# Patient Record
Sex: FEMALE | Race: BLACK OR AFRICAN AMERICAN
[De-identification: names, ages, dates, MRNs, and addresses within clinical notes are randomized per-mention and may not be internally consistent; named-entity substitution may affect disease eponyms.]

---

## 2017-06-22 ENCOUNTER — HOSPITAL ENCOUNTER (EMERGENCY)
Dept: HOSPITAL 17 - NEPK | Age: 22
Discharge: HOME | End: 2017-06-22
Payer: SELF-PAY

## 2017-06-22 VITALS
TEMPERATURE: 98.3 F | RESPIRATION RATE: 18 BRPM | DIASTOLIC BLOOD PRESSURE: 69 MMHG | OXYGEN SATURATION: 100 % | SYSTOLIC BLOOD PRESSURE: 120 MMHG | HEART RATE: 101 BPM

## 2017-06-22 VITALS — WEIGHT: 143.3 LBS | BODY MASS INDEX: 26.37 KG/M2 | HEIGHT: 62 IN
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
## 2017-06-22 DIAGNOSIS — Z72.0: ICD-10-CM

## 2017-06-22 DIAGNOSIS — S81.012A: Primary | ICD-10-CM
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
## 2017-06-22 PROCEDURE — 12001 RPR S/N/AX/GEN/TRNK 2.5CM/<: CPT

## 2017-06-22 PROCEDURE — 73564 X-RAY EXAM KNEE 4 OR MORE: CPT
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
## 2017-06-24 ENCOUNTER — HOSPITAL ENCOUNTER (EMERGENCY)
Dept: HOSPITAL 17 - NEPD | Age: 22
Discharge: HOME | End: 2017-06-24
Payer: SELF-PAY

## 2017-06-24 VITALS
OXYGEN SATURATION: 98 % | RESPIRATION RATE: 16 BRPM | HEART RATE: 103 BPM | SYSTOLIC BLOOD PRESSURE: 124 MMHG | TEMPERATURE: 98.5 F | DIASTOLIC BLOOD PRESSURE: 68 MMHG

## 2017-06-24 VITALS — WEIGHT: 158.73 LBS | HEIGHT: 62 IN | BODY MASS INDEX: 29.21 KG/M2

## 2017-06-24 DIAGNOSIS — Z79.899: ICD-10-CM

## 2017-06-24 DIAGNOSIS — Z48.02: Primary | ICD-10-CM

## 2017-06-24 PROCEDURE — 99281 EMR DPT VST MAYX REQ PHY/QHP: CPT
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
## 2017-07-01 ENCOUNTER — HOSPITAL ENCOUNTER (EMERGENCY)
Dept: HOSPITAL 17 - NEPK | Age: 22
Discharge: HOME | End: 2017-07-01
Payer: SELF-PAY

## 2017-07-01 VITALS
HEART RATE: 95 BPM | TEMPERATURE: 98.8 F | RESPIRATION RATE: 16 BRPM | SYSTOLIC BLOOD PRESSURE: 124 MMHG | DIASTOLIC BLOOD PRESSURE: 69 MMHG | OXYGEN SATURATION: 98 %

## 2017-07-01 VITALS — WEIGHT: 154.32 LBS | BODY MASS INDEX: 28.4 KG/M2 | HEIGHT: 62 IN

## 2017-07-01 DIAGNOSIS — S71.112D: Primary | ICD-10-CM

## 2017-07-01 DIAGNOSIS — Z48.02: ICD-10-CM
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
## 2017-07-01 PROCEDURE — 99281 EMR DPT VST MAYX REQ PHY/QHP: CPT

## 2017-07-01 NOTE — PD
HPI


Chief Complaint:  Wound/Suture/Staple Re-Check


Time Seen by Provider:  16:29


Travel History


International Travel<30 days:  No


Contact w/Intl Traveler<30days:  No


Traveled to known affect area:  No





History of Present Illness


HPI


21-year-old female presents to emergency department requesting suture removal 

to laceration just above her left knee that has been there since .  She 

denies fever, vomiting.  Denies drainage, redness, swelling to the wound site.  

Has no other medical complaints.  No known allergies.  No modifying factors or 

associated signs and symptoms.





PFSH


Past Medical History


Autoimmune Disease:  No


Cardiovascular Problems:  No


Diminished Hearing:  No


Genitourinary:  Yes (STD)


Musculoskeletal:  No


Neurologic:  No


Psychiatric:  No


Respiratory:  No


Immunizations Current:  Yes


Sickle Cell Disease:  No


:  0





Past Surgical History


Oral Surgery:  Yes





Social History


Alcohol Use:  No


Tobacco Use:  Yes


Substance Use:  Yes





Allergies-Medications


(Allergen,Severity, Reaction):  


Coded Allergies:  


     No Known Allergies (Verified , 17)


Reported Meds & Prescriptions





Reported Meds & Active Scripts


Active


Tramadol (Tramadol HCl) 50 Mg Tab 50 Mg PO Q6H PRN


Ibuprofen 600 Mg Tab 600 Mg PO Q6H PRN


Cephalexin 500 Mg Cap 500 Mg PO Q8H


Bactrim DS (Sulfamethoxazole-Trimethoprim) 800-160 Mg Tab 1 Tab PO BID








Review of Systems


Except as stated in HPI:  all other systems reviewed are Neg





Physical Exam


Narrative


GENERAL: Well-nourished, well-developed  female patient, in no 

acute distress


SKIN: Warm and dry.  Laceration just above the left knee that is well 

approximated with sutures intact; without erythema, edema, drainage.  No signs 

of infection.


HEAD: Atraumatic. Normocephalic. 


EYES: Pupils equal and round. No scleral icterus. No injection or drainage.


ENT: Mucosa pink and moist.  Airway patent.  


NECK: Trachea midline.  


CARDIOVASCULAR: Regular rate. 


RESPIRATORY: No accessory muscle use.


GASTROINTESTINAL: Round.


MUSCULOSKELETAL:  No obvious deformities. No clubbing.  No cyanosis.  No edema. 


NEUROLOGICAL: Awake and alert.  Oriented 3.  No obvious cranial nerve 

deficits.  Motor grossly within normal limits. Normal speech. 


PSYCHIATRIC: Appropriate mood and affect; insight and judgment normal.





Data


Data


Last Documented VS





Vital Signs








  Date Time  Temp Pulse Resp B/P Pulse Ox O2 Delivery O2 Flow Rate FiO2


 


17 16:11 98.8 95 16 124/69 98 Room Air  











MDM


Medical Decision Making


Medical Screen Exam Complete:  Yes


Emergency Medical Condition:  Yes


Medical Record Reviewed:  Yes


Differential Diagnosis


Suture removal, staple removal, wound recheck


Narrative Course


21-year-old female requesting sutures to be removed from laceration just above 

her left knee.  There are no signs of infection.  Wound is well approximated 

and sutures are intact.  Sutures were placed on .  Sutures removed.  

Patient tolerated well.  Instructed patient to follow up with primary care 

provider.  Patient verbalizes understanding and agreement with treatment plan.  

Patient is medically cleared and stable for discharge.  Discussed reasons to 

return to the emergency department.  Patient agrees with treatment plan.  The 

patients vital signs are stable and the patient is stable for outpatient follow-

up and treatment.  Patient discharged home, stable and in no acute distress.





Diagnosis





 Primary Impression:  


 Encounter for removal of sutures


Referrals:  


Primary Care Physician


Patient Instructions:  General Instructions, Stitches Removal (ED)





***Additional Instructions:


Follow-up with primary care provider


***Med/Other Pt SpecificInfo:  No Change to Meds, No Meds Exist/No RX given


Disposition:  01 DISCHARGE HOME


Condition:  Stable








Sigrid Mcneal 2017 16:30

## 2017-07-06 ENCOUNTER — HOSPITAL ENCOUNTER (EMERGENCY)
Dept: HOSPITAL 17 - NEPC | Age: 22
LOS: 1 days | Discharge: HOME | End: 2017-07-07
Payer: SELF-PAY

## 2017-07-06 VITALS — WEIGHT: 154.32 LBS | BODY MASS INDEX: 27.34 KG/M2 | HEIGHT: 63 IN

## 2017-07-06 DIAGNOSIS — Z72.0: ICD-10-CM

## 2017-07-06 DIAGNOSIS — A08.4: Primary | ICD-10-CM

## 2017-07-06 PROCEDURE — 85025 COMPLETE CBC W/AUTO DIFF WBC: CPT

## 2017-07-06 PROCEDURE — 84703 CHORIONIC GONADOTROPIN ASSAY: CPT

## 2017-07-06 PROCEDURE — 99284 EMERGENCY DEPT VISIT MOD MDM: CPT

## 2017-07-06 PROCEDURE — 96374 THER/PROPH/DIAG INJ IV PUSH: CPT

## 2017-07-06 PROCEDURE — 80053 COMPREHEN METABOLIC PANEL: CPT

## 2017-07-06 PROCEDURE — 96375 TX/PRO/DX INJ NEW DRUG ADDON: CPT

## 2017-07-06 PROCEDURE — 83690 ASSAY OF LIPASE: CPT

## 2017-07-06 PROCEDURE — 81001 URINALYSIS AUTO W/SCOPE: CPT

## 2017-07-07 VITALS
RESPIRATION RATE: 16 BRPM | HEART RATE: 88 BPM | TEMPERATURE: 100.8 F | SYSTOLIC BLOOD PRESSURE: 121 MMHG | OXYGEN SATURATION: 98 % | DIASTOLIC BLOOD PRESSURE: 62 MMHG

## 2017-07-07 VITALS
OXYGEN SATURATION: 99 % | HEART RATE: 92 BPM | TEMPERATURE: 100.2 F | RESPIRATION RATE: 20 BRPM | DIASTOLIC BLOOD PRESSURE: 67 MMHG | SYSTOLIC BLOOD PRESSURE: 110 MMHG

## 2017-07-07 VITALS — DIASTOLIC BLOOD PRESSURE: 78 MMHG | SYSTOLIC BLOOD PRESSURE: 126 MMHG

## 2017-07-07 LAB
ALP SERPL-CCNC: 57 U/L (ref 45–117)
ALT SERPL-CCNC: 19 U/L (ref 10–53)
ANION GAP SERPL CALC-SCNC: 10 MEQ/L (ref 5–15)
AST SERPL-CCNC: 21 U/L (ref 15–37)
BACTERIA #/AREA URNS HPF: (no result) /HPF
BASOPHILS # BLD AUTO: 0 TH/MM3 (ref 0–0.2)
BASOPHILS NFR BLD: 0.7 % (ref 0–2)
BILIRUB SERPL-MCNC: (no result) MG/DL (ref 0.2–1)
BUN SERPL-MCNC: 4 MG/DL (ref 7–18)
CHLORIDE SERPL-SCNC: 103 MEQ/L (ref 98–107)
COLOR UR: YELLOW
COMMENT (UR): (no result)
CULTURE IF INDICATED: (no result)
EOSINOPHIL # BLD: 0 TH/MM3 (ref 0–0.4)
EOSINOPHIL NFR BLD: 0.1 % (ref 0–4)
ERYTHROCYTE [DISTWIDTH] IN BLOOD BY AUTOMATED COUNT: 22.6 % (ref 11.6–17.2)
GFR SERPLBLD BASED ON 1.73 SQ M-ARVRAT: 132 ML/MIN (ref 89–?)
GLUCOSE UR STRIP-MCNC: (no result) MG/DL
HCO3 BLD-SCNC: 22.7 MEQ/L (ref 21–32)
HCT VFR BLD CALC: 30.2 % (ref 35–46)
HEMO FLAGS: (no result)
HGB UR QL STRIP: (no result)
KETONES UR STRIP-MCNC: (no result) MG/DL
LYMPHOCYTES # BLD AUTO: 0.5 TH/MM3 (ref 1–4.8)
LYMPHOCYTES NFR BLD AUTO: 10.7 % (ref 9–44)
MCH RBC QN AUTO: 25.4 PG (ref 27–34)
MCHC RBC AUTO-ENTMCNC: 32.2 % (ref 32–36)
MCV RBC AUTO: 78.9 FL (ref 80–100)
MONOCYTES NFR BLD: 14 % (ref 0–8)
MUCOUS THREADS #/AREA URNS LPF: (no result) /LPF
NEUTROPHILS # BLD AUTO: 3.8 TH/MM3 (ref 1.8–7.7)
NEUTROPHILS NFR BLD AUTO: 74.5 % (ref 16–70)
NITRITE UR QL STRIP: (no result)
PLATELET # BLD: 392 TH/MM3 (ref 150–450)
POTASSIUM SERPL-SCNC: 3.4 MEQ/L (ref 3.5–5.1)
RBC # BLD AUTO: 3.83 MIL/MM3 (ref 4–5.3)
SODIUM SERPL-SCNC: 136 MEQ/L (ref 136–145)
SP GR UR STRIP: 1.02 (ref 1–1.03)
SQUAMOUS #/AREA URNS HPF: 36 /HPF (ref 0–5)
WBC # BLD AUTO: 5.1 TH/MM3 (ref 4–11)

## 2017-07-07 NOTE — PD
HPI


Chief Complaint:  Cold / Flu Symptoms


Time Seen by Provider:  01:56


Travel History


International Travel<30 days:  No


Contact w/Intl Traveler<30days:  No


Traveled to known affect area:  No





History of Present Illness


HPI


C/O N/V/D/ CRAMPY ABD PAIN ONSET YESTERDAY, NOT IMPROVING, PAIN IN ABD IS 

CRAMPY AND ONLY WITH BM, NONRAD PAIN, 5/10 ALTHOUGH CURRENTLY IS PAIN FREE IN 

ABD BUT FEELS BODY ACHES...





PFSH


Past Medical History


Autoimmune Disease:  No


Cardiovascular Problems:  No


Diminished Hearing:  No


Gastrointestinal Disorders:  No


Genitourinary:  Yes (STD)


Musculoskeletal:  No


Neurologic:  No


Psychiatric:  No


Respiratory:  No


Immunizations Current:  Yes


Sickle Cell Disease:  No


:  0





Past Surgical History


Oral Surgery:  Yes


Other Surgery:  No





Social History


Alcohol Use:  No


Tobacco Use:  Yes


Substance Use:  No





Allergies-Medications


(Allergen,Severity, Reaction):  


Coded Allergies:  


     No Known Allergies (Verified , 17)


Reported Meds & Prescriptions





Reported Meds & Active Scripts


Active


Tramadol (Tramadol HCl) 50 Mg Tab 50 Mg PO Q6H PRN


Ibuprofen 600 Mg Tab 600 Mg PO Q6H PRN


Cephalexin 500 Mg Cap 500 Mg PO Q8H


Bactrim DS (Sulfamethoxazole-Trimethoprim) 800-160 Mg Tab 1 Tab PO BID








Review of Systems


Except as stated in HPI:  all other systems reviewed are Neg


General / Constitutional:  Positive: Fever, Chills


Respiratory:  Positive: Cough


Gastrointestinal:  Positive: Nausea, Vomiting, Diarrhea





Physical Exam


Narrative


GENERAL: 


SKIN: Warm and dry.


HEAD: Atraumatic. Normocephalic. 


EYES: Pupils equal and round. No scleral icterus. No injection or drainage. 


ENT: No nasal bleeding or discharge.  Mucous membranes pink and moist.


NECK: Trachea midline. No JVD. 


CARDIOVASCULAR: Regular rate and rhythm.  


RESPIRATORY: No accessory muscle use. Clear to auscultation. Breath sounds 

equal bilaterally. 


GASTROINTESTINAL: Abdomen soft, non-tender, nondistended. HYPERACTIVE BS


MUSCULOSKELETAL: Extremities without clubbing, cyanosis, or edema. No obvious 

deformities. 


NEUROLOGICAL: Awake and alert. No obvious cranial nerve deficits.  Motor 

grossly within normal limits. Five out of 5 muscle strength in the arms and 

legs.  Normal speech.


PSYCHIATRIC: Appropriate mood and affect; insight and judgment normal.





Data


Data


Last Documented VS





Vital Signs








  Date Time  Temp Pulse Resp B/P Pulse Ox O2 Delivery O2 Flow Rate FiO2


 


17 02:01 100.2 92 20 110/67 99 Room Air  








Orders





 Complete Blood Count With Diff (17 02:05)


Comprehensive Metabolic Panel (17 02:05)


Lipase (17 02:05)


Urinalysis - C+S If Indicated (17 02:05)


Ed Urine Pregnancytest Poc (17 02:05)


Ondansetron Inj (Zofran Inj) (17 02:15)


Ketorolac Inj (Toradol Inj) (17 02:15)


Sodium Chlor 0.9% 1000 Ml Inj (Ns 1000 M (17 02:15)





Labs





 Laboratory Tests








Test 17





 02:10


 


White Blood Count 5.1 TH/MM3


 


Red Blood Count 3.83 MIL/MM3


 


Hemoglobin 9.7 GM/DL


 


Hematocrit 30.2 %


 


Mean Corpuscular Volume 78.9 FL


 


Mean Corpuscular Hemoglobin 25.4 PG


 


Mean Corpuscular Hemoglobin 32.2 %





Concent 


 


Red Cell Distribution Width 22.6 %


 


Platelet Count 392 TH/MM3


 


Mean Platelet Volume 6.8 FL


 


Neutrophils (%) (Auto) 74.5 %


 


Lymphocytes (%) (Auto) 10.7 %


 


Monocytes (%) (Auto) 14.0 %


 


Eosinophils (%) (Auto) 0.1 %


 


Basophils (%) (Auto) 0.7 %


 


Neutrophils # (Auto) 3.8 TH/MM3


 


Lymphocytes # (Auto) 0.5 TH/MM3


 


Monocytes # (Auto) 0.7 TH/MM3


 


Eosinophils # (Auto) 0.0 TH/MM3


 


Basophils # (Auto) 0.0 TH/MM3


 


CBC Comment DIFF FINAL 


 


Differential Comment  


 


Urine Color YELLOW 


 


Urine Turbidity CLOUDY 


 


Urine pH 5.5 


 


Urine Specific Gravity 1.020 


 


Urine Protein TRACE mg/dL


 


Urine Glucose (UA) NEG mg/dL


 


Urine Ketones NEG mg/dL


 


Urine Occult Blood NEG 


 


Urine Nitrite NEG 


 


Urine Bilirubin NEG 


 


Urine Urobilinogen LESS THAN 2.0





 MG/DL


 


Urine Leukocyte Esterase MOD 


 


Urine RBC 2 /hpf


 


Urine WBC 7 /hpf


 


Urine Squamous Epithelial 36 /hpf





Cells 


 


Urine Bacteria RARE /hpf


 


Urine Mucus MANY /lpf


 


Microscopic Urinalysis Comment CULT NOT





 INDICATED


 


Sodium Level 136 MEQ/L


 


Potassium Level 3.4 MEQ/L


 


Chloride Level 103 MEQ/L


 


Carbon Dioxide Level 22.7 MEQ/L


 


Anion Gap 10 MEQ/L


 


Blood Urea Nitrogen 4 MG/DL


 


Creatinine 0.68 MG/DL


 


Estimat Glomerular Filtration 132 ML/MIN





Rate 


 


Random Glucose 88 MG/DL


 


Calcium Level 8.4 MG/DL


 


Total Bilirubin LESS THAN 0.1





 MG/DL


 


Aspartate Amino Transf 21 U/L





(AST/SGOT) 


 


Alanine Aminotransferase 19 U/L





(ALT/SGPT) 


 


Alkaline Phosphatase 57 U/L


 


Total Protein 7.5 GM/DL


 


Albumin 3.4 GM/DL


 


Lipase 120 U/L











[facility]


Medical Decision Making


Medical Screen Exam Complete:  Yes


Emergency Medical Condition:  Yes


Medical Record Reviewed:  Yes


Differential Diagnosis


UTI V VIRAL ENTERITIS V FLU V BACTERIAL ENTERITIS V ELECTROLYTE ABNL V 

PREGNANCY RELATED ILLNESS


Narrative Course


PATIENT WAS FOUND TO BE POSITIVE URINARY PREGNANCY





Diagnosis





 Primary Impression:  


 VIRAL GASTROENTERITIS


 Additional Impression:  


 NEWLY DIAGNOSED PREGNANCY


Patient Instructions:  General Instructions, Pregnancy (ED), Viral Syndrome (ED)


Scripts


Metoclopramide (Reglan)10 Mg Tab10 Mg PO QID  #20 TAB  Ref 0


   Prov:Harpreet Mendiola MD         17


Disposition:  01 DISCHARGE HOME


Condition:  Stable








Harpreet Mendiola MD 2017 02:05

## 2017-12-15 ENCOUNTER — HOSPITAL ENCOUNTER (EMERGENCY)
Dept: HOSPITAL 17 - HOBED | Age: 22
Discharge: HOME | End: 2017-12-15
Payer: MEDICAID

## 2017-12-15 VITALS
TEMPERATURE: 98.2 F | DIASTOLIC BLOOD PRESSURE: 70 MMHG | SYSTOLIC BLOOD PRESSURE: 126 MMHG | HEART RATE: 92 BPM | RESPIRATION RATE: 14 BRPM | OXYGEN SATURATION: 97 %

## 2017-12-15 DIAGNOSIS — B96.89: ICD-10-CM

## 2017-12-15 DIAGNOSIS — O40.3XX0: ICD-10-CM

## 2017-12-15 DIAGNOSIS — O23.43: Primary | ICD-10-CM

## 2017-12-15 DIAGNOSIS — Z3A.35: ICD-10-CM

## 2017-12-15 DIAGNOSIS — O23.593: ICD-10-CM

## 2017-12-15 DIAGNOSIS — O99.333: ICD-10-CM

## 2017-12-15 DIAGNOSIS — Z79.899: ICD-10-CM

## 2017-12-15 DIAGNOSIS — N76.0: ICD-10-CM

## 2017-12-15 LAB
ALBUMIN SERPL-MCNC: 2.7 GM/DL (ref 3.4–5)
ALP SERPL-CCNC: 161 U/L (ref 45–117)
ALT SERPL-CCNC: 24 U/L (ref 10–53)
AST SERPL-CCNC: 23 U/L (ref 15–37)
BACTERIA #/AREA URNS HPF: (no result) /HPF
BASOPHILS # BLD AUTO: 0.1 TH/MM3 (ref 0–0.2)
BASOPHILS NFR BLD: 0.7 % (ref 0–2)
BILIRUB SERPL-MCNC: 0.2 MG/DL (ref 0.2–1)
BUN SERPL-MCNC: 7 MG/DL (ref 7–18)
CALCIUM SERPL-MCNC: 8.7 MG/DL (ref 8.5–10.1)
CHLORIDE SERPL-SCNC: 106 MEQ/L (ref 98–107)
COLOR UR: YELLOW
CREAT SERPL-MCNC: 0.45 MG/DL (ref 0.5–1)
EOSINOPHIL # BLD: 0.1 TH/MM3 (ref 0–0.4)
EOSINOPHIL NFR BLD: 0.8 % (ref 0–4)
ERYTHROCYTE [DISTWIDTH] IN BLOOD BY AUTOMATED COUNT: 20.1 % (ref 11.6–17.2)
GFR SERPLBLD BASED ON 1.73 SQ M-ARVRAT: 211 ML/MIN (ref 89–?)
GLUCOSE SERPL-MCNC: 77 MG/DL (ref 74–106)
GLUCOSE UR STRIP-MCNC: (no result) MG/DL
HCO3 BLD-SCNC: 24.2 MEQ/L (ref 21–32)
HCT VFR BLD CALC: 28.1 % (ref 35–46)
HGB BLD-MCNC: 9.2 GM/DL (ref 11.6–15.3)
HGB UR QL STRIP: (no result)
KETONES UR STRIP-MCNC: (no result) MG/DL
LYMPHOCYTES # BLD AUTO: 1.7 TH/MM3 (ref 1–4.8)
LYMPHOCYTES NFR BLD AUTO: 22.9 % (ref 9–44)
MCH RBC QN AUTO: 25.8 PG (ref 27–34)
MCHC RBC AUTO-ENTMCNC: 32.7 % (ref 32–36)
MCV RBC AUTO: 79 FL (ref 80–100)
MONOCYTE #: 0.5 TH/MM3 (ref 0–0.9)
MONOCYTES NFR BLD: 7.2 % (ref 0–8)
MUCOUS THREADS #/AREA URNS LPF: (no result) /LPF
NEUTROPHILS # BLD AUTO: 5.1 TH/MM3 (ref 1.8–7.7)
NEUTROPHILS NFR BLD AUTO: 68.4 % (ref 16–70)
NITRITE UR QL STRIP: (no result)
PLATELET # BLD: 307 TH/MM3 (ref 150–450)
PMV BLD AUTO: 8 FL (ref 7–11)
PROT SERPL-MCNC: 6.4 GM/DL (ref 6.4–8.2)
RBC # BLD AUTO: 3.56 MIL/MM3 (ref 4–5.3)
SODIUM SERPL-SCNC: 139 MEQ/L (ref 136–145)
SP GR UR STRIP: 1.01 (ref 1–1.03)
SQUAMOUS #/AREA URNS HPF: 27 /HPF (ref 0–5)
URINE LEUKOCYTE ESTERASE: (no result)
WBC # BLD AUTO: 7.5 TH/MM3 (ref 4–11)

## 2017-12-15 PROCEDURE — 86900 BLOOD TYPING SEROLOGIC ABO: CPT

## 2017-12-15 PROCEDURE — 85025 COMPLETE CBC W/AUTO DIFF WBC: CPT

## 2017-12-15 PROCEDURE — 87086 URINE CULTURE/COLONY COUNT: CPT

## 2017-12-15 PROCEDURE — 81001 URINALYSIS AUTO W/SCOPE: CPT

## 2017-12-15 PROCEDURE — 99284 EMERGENCY DEPT VISIT MOD MDM: CPT

## 2017-12-15 PROCEDURE — 86901 BLOOD TYPING SEROLOGIC RH(D): CPT

## 2017-12-15 PROCEDURE — 86762 RUBELLA ANTIBODY: CPT

## 2017-12-15 PROCEDURE — 87591 N.GONORRHOEAE DNA AMP PROB: CPT

## 2017-12-15 PROCEDURE — G0481 DRUG TEST DEF 8-14 CLASSES: HCPCS

## 2017-12-15 PROCEDURE — 86403 PARTICLE AGGLUT ANTBDY SCRN: CPT

## 2017-12-15 PROCEDURE — 86592 SYPHILIS TEST NON-TREP QUAL: CPT

## 2017-12-15 PROCEDURE — 87491 CHLMYD TRACH DNA AMP PROBE: CPT

## 2017-12-15 PROCEDURE — 80074 ACUTE HEPATITIS PANEL: CPT

## 2017-12-15 PROCEDURE — 86850 RBC ANTIBODY SCREEN: CPT

## 2017-12-15 PROCEDURE — 87150 DNA/RNA AMPLIFIED PROBE: CPT

## 2017-12-15 PROCEDURE — 86703 HIV-1/HIV-2 1 RESULT ANTBDY: CPT

## 2017-12-15 PROCEDURE — 80307 DRUG TEST PRSMV CHEM ANLYZR: CPT

## 2017-12-15 PROCEDURE — 76805 OB US >/= 14 WKS SNGL FETUS: CPT

## 2017-12-15 PROCEDURE — 80053 COMPREHEN METABOLIC PANEL: CPT

## 2017-12-15 PROCEDURE — 36415 COLL VENOUS BLD VENIPUNCTURE: CPT

## 2017-12-15 PROCEDURE — 87081 CULTURE SCREEN ONLY: CPT

## 2017-12-15 NOTE — HHI.DCPOC
Discharge Care Plan


Report Symptoms to Your Doctor


-Temperature above 100.5 degrees


-Redness, of incision or excessive or foul smelling drainage


-Unusual pain or calf pain


-Increased vaginal bleeding


-Painful or difficulty urinating


-Feelings of extreme sadness or anxiety after 2 weeks


Goals to Promote Your Health


* To prevent worsening of your condition and complications, please take your 

medication as prescribed.


* To maintain your health at the optimal level, please follow up with your 

doctor in 1 week.


Directions to Meet Your Goals


*** Take your medications as prescribed


*** Follow your dietary instruction


*** Follow activity as directed


*** Ensure plenty of rest for recovery


*** Drink fluids for hydration








*** Keep your appointments as scheduled


*** Take your immunizations and boosters as scheduled


*** If your symptoms worsen call your PCP, if no PCP go to Urgent Care Center 

or Emergency Room***


*** Smoking is Dangerous to Your Health. Avoid second hand smoke***


***Call the 24-hour crisis hotline for domestic abuse at 1-641.734.3370***











Maged Rodriguez MD R1 Dec 15, 2017 16:47

## 2017-12-15 NOTE — PD
HPI


Chief Complaint


contraction pain


Date Seen:  Dec 15, 2017


Time Seen:  13:45


Travel History


International Travel<30 Days:  No


Contact w/Intl Traveler<30Days:  No


Known Affected Area:  No





History of Present Illness


HPI


Ms Ingram is a 21YO  at 35 weeks by LMP in early April with no PNC who 

presents to the OB ED with contractile abdominal pain, low back pain and pain 

behind her bilateral lower legs/knees. Pt has had some mucousy discharge, but 

no vaginal bleeding. Pt also has a HA and emesis x3 today but no visual 

disturbances or dizziness. She feels like the contractions are coming every 3 

minutes. Pt denies allergies and medications. Denies CP, SOB, diarrhea.


Weeks Gestation:  35


Para:  0


:  1


Miscarriage:  0


:  0





History


Past Medical History


Medical History:  Denies Significant Hx





Past Surgical History


Surgical History:  No Previous Surgery





Family History


*** Narrative Family History


Mother and Father ; mother  of CHF and father  from cancer





Social History


Alcohol Use:  No


Tobacco Use:  Yes (smokes when in pain or angry--less than 1 cigarette per day)


Substance Abuse:  No (denies; however, on previous hospital encounter was UDS 

positive cannabinoids)





Allergies-Medications


(Allergen,Severity, Reaction):  


Coded Allergies:  


     No Known Allergies (Verified , 17)


Home Meds


Active Scripts


Metoclopramide (Reglan) 10 Mg Tab, 10 MG PO QID, #20 TAB 0 Refills


   Prov:Harpreet Mendiola MD         17


Tramadol (Tramadol) 50 Mg Tab, 50 MG PO Q6H Y for PAIN, #20 TAB


   Prov:Lang Encinas MD         17


Ibuprofen (Ibuprofen) 600 Mg Tab, 600 MG PO Q6H Y for Pain/Inflammation, #40 TAB


   Prov:Lang Encinas MD         17


Cephalexin (Cephalexin) 500 Mg Cap, 500 MG PO Q8H for Infection, #21 CAP


   Prov:Lang Encinas MD         17


Sulfamethoxazole-Trimethoprim (Bactrim DS) 800-160 Mg Tab, 1 TAB PO BID for 

Infection, #14 TAB


   Prov:Lang Encinas MD         17


Narrative Medication


Denies taking medications





Review of Systems


General / Constitutional:  No: Fever, Chills


Eyes:  No: Blurred Vision, Visual changes


HENT:  Headaches


Cardiovascular:  No: Chest Pain or Discomfort, Palpitations


Respiratory:  No: Short of Breath


Gastrointestinal:  Nausea, Vomiting, Abdominal Pain, No: Diarrhea


Genitourinary:  Discharge, No: Dysuria, Vaginal Bleeding


Skin:  No Rash


Neurologic:  No: Dizziness, Headache





Physical Exam





Vital Signs








  Date Time  Temp Pulse Resp B/P (MAP) Pulse Ox O2 Delivery O2 Flow Rate FiO2


 


12/15/17 12:27 98.2 92 14 126/70 (88) 97   








Narrative


GENERAL: Well-nourished, well-developed patient in mild discomfort lying in bed.


SKIN: Warm and dry. No rash or lesions.


HEAD: Normocephalic and atraumatic.


EYES: No scleral icterus. No injection or drainage. EOMI.


ENT: No nasal drainage noted. Mucous membranes pink. Airway patent.


NECK: Supple, trachea midline. No lymphadenopathy.


CARDIOVASCULAR: Regular rate and rhythm without murmur, gallop, or rub. 


RESPIRATORY: Breath sounds equal bilaterally. No accessory muscle use.


ABDOMEN/GI: Abdomen soft, non-tender, bowel sounds present, no rebound, no 

guarding 


   Gravid to 35 weeks size


GENITOURINARY: 


   Cervix: closed


   Dilatation: -          


   Effacement: -          


   Station: -  


   Presentation: -        


   Membranes: intact


   Uterine Contractions: none


FHT's: 


   Category: 1   


   Baseline: 140   


   Reactive: yes   


   Variability: moderate  


   Decels: none  


EXTREMITIES: No cyanosis or edema.


BACK: Nontender without obvious deformity. No CVA tenderness.


NEUROLOGICAL: Awake and alert. Motor and sensory grossly within normal limits. 

Five out of 5 muscle strength in all muscle groups. Normal speech.





Data


Data


Orders





 Orders


Ed Discharge Order (12/15/17 13:25)


Vital Signs (Adult) .ON ADMISSION (12/15/17 14:00)


^ Labor Status (12/15/17 14:00)


Fetal Heart (12/15/17 14:00)


Urinalysis - C+S If Indicated (12/15/17 14:00)


^ Non Stress Test (12/15/17 14:00)


^ Hydration (12/15/17 14:00)


Comprehensive Metabolic Panel (12/15/17 14:00)


Type And Screen (12/15/17 14:00)


Gc And Chlamydia Pcr (12/15/17 14:00)


Ob/Psych Drug Screen, Urine (12/15/17 14:00)


Rubella Immune Status (12/15/17 14:00)


Hepatitis Profile (12/15/17 14:00)


Rapid Plasma Regin (Rpr) W Ttr (12/15/17 14:00)


Complete Blood Count With Diff (12/15/17 14:00)


Hiv Antibody Screen (12/15/17 14:00)


Us Ob Pelvis >14 Wks Fetus (12/15/17 )





MDM


Narrative Course / MDM


21YO  at 35 weeks per LMP early 2017 with no prenatal care presents 

to the ED with contractile pain, back pain, upper portion of LE pain. Monitor 

shows no contractions, Cat 1 tracing, , reactive, moderate and no decels.





PLAN:


-Fetal monitoring showing Cat 1 tracing as above


-Prenatal labs pending


-OB US indicating amniotic fluid index of 24.9, 35/6 weeks gestational age, 

fetal wt 2919g (60% for gest age), BPP 8/8


-UA with large leuko esterase, trace lysed blood


-Start Macrobid 100mg BID x5 days


-Rubella immune


-CMP wnl, CBC w/ H/H 9.2/28.1


-Cervical exam with closed cervix





Discharge home.





Seen and discussed with Charmaine Blancas and Roly


Diagnosis


Diagnosis:  


 Primary Impression:  


 UTI (urinary tract infection) in pregnancy in third trimester


 Additional Impressions:  


 Polyhydramnios affecting pregnancy in third trimester


 Bacterial vaginosis


 No prenatal care in current pregnancy


 35 weeks gestation of pregnancy


Disposition:  01 DISCHARGE HOME


Condition:  Stable


Patient Instructions:  General Instructions





**Additional Instructions**:  


Go to the [facility] immediately


Departure Forms:  Tests/Procedures











Maged Rodriguez MD R1 Dec 15, 2017 14:28

## 2017-12-15 NOTE — PD
HPI


Chief Complaint:  Medical Clearance


Time Seen by Provider:  13:13


Travel History


International Travel<30 days:  No


Contact w/Intl Traveler<30days:  No


Traveled to known affect area:  No





History of Present Illness


HPI


22-year-old pregnant female presents to the emergency Department with complaint 

of abdominal cramping every 2-3 minutes and the feeling like she has to "booboo.

"  .  Last menstrual period beginning of .  Denies vaginal 

bleeding or leakage.  Reports white vaginal discharge times one week.  Says she 

was seen here in July and had a positive urine pregnancy test.  Does not have 

an established obstetrician.  Says her Medicaid was not approved fast enough 

and when she called for appointments she was told she was too far along.  

Reports vomiting one time yesterday.  Denies recent illness, including fevers.  

Denies dysuria or change in stool.  Denies chest pain or shortness of breath.  

No known allergies.  Has not taken any medications or tried any treatments to 

alleviate her symptoms.  Does not have an established primary care provider.  

Has no other medical complaints.  No other modifying factors or associated 

signs and symptoms.





PFSH


Past Medical History


Autoimmune Disease:  No


Cardiovascular Problems:  No


Diminished Hearing:  No


Gastrointestinal Disorders:  No


Genitourinary:  Yes (STD)


Musculoskeletal:  No


Neurologic:  No


Psychiatric:  No


Respiratory:  No


Immunizations Current:  Yes


Sickle Cell Disease:  No


Pregnant?:  Pregnant


:  0





Past Surgical History


Oral Surgery:  Yes


Other Surgery:  No





Social History


Alcohol Use:  No


Tobacco Use:  Yes ( PPD)


Substance Use:  No





Allergies-Medications


(Allergen,Severity, Reaction):  


Coded Allergies:  


     No Known Allergies (Verified , 17)


Reported Meds & Prescriptions





Reported Meds & Active Scripts


Active


Reglan (Metoclopramide HCl) 10 Mg Tab 10 Mg PO QID


Tramadol (Tramadol HCl) 50 Mg Tab 50 Mg PO Q6H PRN


Ibuprofen 600 Mg Tab 600 Mg PO Q6H PRN


Cephalexin 500 Mg Cap 500 Mg PO Q8H


Bactrim DS (Sulfamethoxazole-Trimethoprim) 800-160 Mg Tab 1 Tab PO BID








Review of Systems


Except as stated in HPI:  all other systems reviewed are Neg





Physical Exam


Narrative


GENERAL: Well-nourished, well-developed black female patient, in no acute 

distress


SKIN: Warm and dry.


HEAD: Atraumatic. Normocephalic. 


EYES: Pupils equal and round. No scleral icterus. No injection or drainage.


ENT: Mucosa pink and moist.  Airway patent.  


NECK: Trachea midline.  


CARDIOVASCULAR: Regular rate and rhythm.  No murmur appreciated.  


RESPIRATORY: No accessory muscle use.  Breath sounds clear and equal 

bilaterally.  No retractions or tachypnea.


GASTROINTESTINAL: Pregnant.  Abdomen soft, non-tender.  Bowel sounds 4 

quadrants.


MUSCULOSKELETAL: No obvious deformities. No clubbing.  No cyanosis.  No edema. 


NEUROLOGICAL: Awake and alert.  Oriented 3.  No obvious cranial nerve 

deficits.  Motor grossly within normal limits. Normal speech. 


PSYCHIATRIC: Appropriate mood and affect; insight and judgment normal.





Data


Data


Last Documented VS





Vital Signs








  Date Time  Temp Pulse Resp B/P (MAP) Pulse Ox O2 Delivery O2 Flow Rate FiO2


 


12/15/17 12:27 98.2 92 14 126/70 (88) 97   











MDM


Medical Decision Making


Medical Screen Exam Complete:  Yes


Emergency Medical Condition:  Yes


Medical Record Reviewed:  Yes


Differential Diagnosis


Labor, pregnancy, medical clearance


Narrative Course


22-year-old female with a positive UPT.  Last menstrual period beginning of 

April.  Fetal heart tones 140s.  Patient reports having abdominal cramping 

every 2-3 minutes.  


1318:  Patient will be taken to the OB ED for further treatment and evaluation.





Diagnosis





 Primary Impression:  


 Pregnancy


 Qualified Codes:  Z34.90 - Encounter for supervision of normal pregnancy, 

unspecified, unspecified trimester


 Additional Impression:  


 Abdominal cramping


Referrals:  


Obstetrician





***Additional Instructions:  


Go to the [facility] immediately


***Med/Other Pt SpecificInfo:  No Change to Meds, No Meds Exist/No RX given


Disposition:  01 DISCHARGE HOME


Condition:  Stable











Sigrid Mcneal Dec 15, 2017 13:24

## 2017-12-18 ENCOUNTER — HOSPITAL ENCOUNTER (EMERGENCY)
Dept: HOSPITAL 17 - HOBED | Age: 22
Discharge: HOME | End: 2017-12-18
Payer: MEDICAID

## 2017-12-18 VITALS — DIASTOLIC BLOOD PRESSURE: 82 MMHG | SYSTOLIC BLOOD PRESSURE: 125 MMHG | HEART RATE: 101 BPM

## 2017-12-18 DIAGNOSIS — B96.89: ICD-10-CM

## 2017-12-18 DIAGNOSIS — J02.9: ICD-10-CM

## 2017-12-18 DIAGNOSIS — O23.43: ICD-10-CM

## 2017-12-18 DIAGNOSIS — O99.513: Primary | ICD-10-CM

## 2017-12-18 DIAGNOSIS — Z3A.36: ICD-10-CM

## 2017-12-18 LAB
ALBUMIN SERPL-MCNC: 2.6 GM/DL (ref 3.4–5)
ALP SERPL-CCNC: 157 U/L (ref 45–117)
ALT SERPL-CCNC: 22 U/L (ref 10–53)
AST SERPL-CCNC: 22 U/L (ref 15–37)
BACTERIA #/AREA URNS HPF: (no result) /HPF
BASOPHILS # BLD AUTO: 0 TH/MM3 (ref 0–0.2)
BASOPHILS NFR BLD: 0.2 % (ref 0–2)
BILIRUB SERPL-MCNC: 0.2 MG/DL (ref 0.2–1)
BUN SERPL-MCNC: 3 MG/DL (ref 7–18)
CALCIUM SERPL-MCNC: 8.2 MG/DL (ref 8.5–10.1)
CHLORIDE SERPL-SCNC: 106 MEQ/L (ref 98–107)
COLOR UR: YELLOW
CREAT SERPL-MCNC: 0.5 MG/DL (ref 0.5–1)
EOSINOPHIL # BLD: 0.1 TH/MM3 (ref 0–0.4)
EOSINOPHIL NFR BLD: 0.5 % (ref 0–4)
ERYTHROCYTE [DISTWIDTH] IN BLOOD BY AUTOMATED COUNT: 19.3 % (ref 11.6–17.2)
GFR SERPLBLD BASED ON 1.73 SQ M-ARVRAT: 187 ML/MIN (ref 89–?)
GLUCOSE SERPL-MCNC: 76 MG/DL (ref 74–106)
GLUCOSE UR STRIP-MCNC: (no result) MG/DL
HCO3 BLD-SCNC: 23 MEQ/L (ref 21–32)
HCT VFR BLD CALC: 29.3 % (ref 35–46)
HEPATITIS A AB IGM: NEGATIVE
HEPATITIS B CORE AB IGM: NEGATIVE
HEPATITIS B SURFACE ANTIGEN: NEGATIVE
HEPATITIS C AB IGG: NEGATIVE
HGB BLD-MCNC: 9.3 GM/DL (ref 11.6–15.3)
HGB UR QL STRIP: (no result)
HYALINE CASTS #/AREA URNS LPF: 1 /LPF
KETONES UR STRIP-MCNC: (no result) MG/DL
LYMPHOCYTES # BLD AUTO: 0.9 TH/MM3 (ref 1–4.8)
LYMPHOCYTES NFR BLD AUTO: 6.1 % (ref 9–44)
LYMPHOCYTES: 7 % (ref 9–44)
MCH RBC QN AUTO: 25.1 PG (ref 27–34)
MCHC RBC AUTO-ENTMCNC: 31.6 % (ref 32–36)
MCV RBC AUTO: 79.5 FL (ref 80–100)
MONOCYTE #: 1 TH/MM3 (ref 0–0.9)
MONOCYTES NFR BLD: 7.4 % (ref 0–8)
MONOCYTES: 6 % (ref 0–8)
MUCOUS THREADS #/AREA URNS LPF: (no result) /LPF
NEUTROPHILS # BLD AUTO: 12.2 TH/MM3 (ref 1.8–7.7)
NEUTROPHILS NFR BLD AUTO: 85.8 % (ref 16–70)
NEUTS BAND # BLD MANUAL: 12.4 TH/MM3 (ref 1.8–7.7)
NEUTS SEG NFR BLD MANUAL: 87 % (ref 16–70)
NITRITE UR QL STRIP: (no result)
NUCLEATED RED BLOOD CELL: 1 (ref 0–0)
PLATELET # BLD: 268 TH/MM3 (ref 150–450)
PMV BLD AUTO: 8.1 FL (ref 7–11)
PROT SERPL-MCNC: 6.8 GM/DL (ref 6.4–8.2)
RBC # BLD AUTO: 3.69 MIL/MM3 (ref 4–5.3)
SODIUM SERPL-SCNC: 137 MEQ/L (ref 136–145)
SP GR UR STRIP: 1 (ref 1–1.03)
SQUAMOUS #/AREA URNS HPF: 39 /HPF (ref 0–5)
URINE LEUKOCYTE ESTERASE: (no result)
WBC # BLD AUTO: 14.2 TH/MM3 (ref 4–11)
WBC NRBC COR # BLD: 1 /100 WBC (ref 0–0)

## 2017-12-18 PROCEDURE — 87086 URINE CULTURE/COLONY COUNT: CPT

## 2017-12-18 PROCEDURE — 59025 FETAL NON-STRESS TEST: CPT

## 2017-12-18 PROCEDURE — 81001 URINALYSIS AUTO W/SCOPE: CPT

## 2017-12-18 PROCEDURE — 80307 DRUG TEST PRSMV CHEM ANLYZR: CPT

## 2017-12-18 PROCEDURE — G0481 DRUG TEST DEF 8-14 CLASSES: HCPCS

## 2017-12-18 PROCEDURE — 80053 COMPREHEN METABOLIC PANEL: CPT

## 2017-12-18 PROCEDURE — 99284 EMERGENCY DEPT VISIT MOD MDM: CPT

## 2017-12-18 PROCEDURE — 85027 COMPLETE CBC AUTOMATED: CPT

## 2017-12-18 PROCEDURE — 85007 BL SMEAR W/DIFF WBC COUNT: CPT

## 2017-12-18 NOTE — PD
HPI


Chief Complaint


body aches


Date Seen:  Dec 18, 2017


Time Seen:  09:44


Travel History


International Travel<30 Days:  No


Contact w/Intl Traveler<30Days:  No





History of Present Illness


HPI


Patient is a homeless 22-year-old  at 36 weeks who presents with body 

aches.





Patient presented to the ED on 12/15 for abdominal pain and pain in her lower 

legs. Was noticing mucousy discharge. Was found to be positive for Trichomonas 

and bacterial vaginosis. Diagnosed with UTI and started on Macrobid 100 mg 

twice a day and Flagyl 500 mg daily. Patient presents today with complaints of 

body aches and a sore throat that started after her last visit to the ED. She 

endorses sore throat. Denies fever, nausea/vomiting, diarrhea, dysuria, or 

discharge. Does not have a primary care provider/OB/GYN. He states that she 

first found out that she was pregnant in August at [place] and had ultrasound (

was 11 weeks). Last ultrasound was done at 12/15 visit showing amniotic fluid 

index of 24.9, 35/6 weeks gestational age, and fetal weight 96365 g. BPP was 8/

8. Patient has had no other issues this pregnancy. Has a history of IV drug use

, but states that she has not been smoking or using any drugs since living with 

brother (has been one-week).


Weeks Gestation:  36


Para:  0


:  1





History


Past Medical History


Medical History:  Denies Significant Hx





Past Surgical History


Surgical History:  No Previous Surgery





Family History


Family History:  Negative





Social History


Alcohol Use:  No


Tobacco Use:  No


Substance Abuse:  No





Allergies-Medications


(Allergen,Severity, Reaction):  


Coded Allergies:  


     No Known Allergies (Verified  Allergy, Unknown, 17)


Home Meds


Active Scripts


Metronidazole (Flagyl) 500 Mg Tab, 500 MG PO BID for Infection, #14 TAB 0 

Refills


   Prov:Gardenia Dunham MD R2         12/15/17


Nitrofurantoin Monohydrate Macrocrystals (Macrobid) 100 Mg Cap, 100 MG PO BID 

for Infection for 5 Days, #10 CAP 0 Refills


   Prov:Maged Rodriguez MD R1         12/15/17


Metoclopramide (Reglan) 10 Mg Tab, 10 MG PO QID, #20 TAB 0 Refills


   Prov:Harpreet Mendiola MD         17


Tramadol (Tramadol) 50 Mg Tab, 50 MG PO Q6H Y for PAIN, #20 TAB


   Prov:Lang Encinas MD         17


Ibuprofen (Ibuprofen) 600 Mg Tab, 600 MG PO Q6H Y for Pain/Inflammation, #40 TAB


   Prov:Lang Encinas MD         17


Cephalexin (Cephalexin) 500 Mg Cap, 500 MG PO Q8H for Infection, #21 CAP


   Prov:Lang Encinas MD         17


Sulfamethoxazole-Trimethoprim (Bactrim DS) 800-160 Mg Tab, 1 TAB PO BID for 

Infection, #14 TAB


   Prov:Lang Encinas MD         17





Review of Systems


Except as stated in HPI:  all other systems reviewed are Neg





Physical Exam





Vital Signs








  Date Time  Temp Pulse Resp B/P (MAP) Pulse Ox O2 Delivery O2 Flow Rate FiO2


 


17 09:12  101  125/82 (96)    








Narrative


GENERAL: Well-nourished, well-developed patient.


SKIN: Warm and dry.


HEAD: Normocephalic and atraumatic.


EYES: No scleral icterus. No injection or drainage. 


ENT: No nasal drainage noted. Mucous membranes pink. Airway patent.


NECK: Supple, trachea midline. No JVD.


CARDIOVASCULAR: Regular rate and rhythm without murmurs, gallops, or rubs. 


RESPIRATORY: Breath sounds equal bilaterally. No accessory muscle use.


ABDOMEN/GI: Abdomen soft, non-tender, bowel sounds present, no rebound, no 

guarding 


GENITOURINARY: 


   Cervix: Posterior


   Dilatation: 0


   Effacement: Closed


   Station: [-]  


   Presentation: [-]        


   Membranes: [intact or ruptured]


   Uterine Contractions: [-]


FHT's: 


   Category: 1


   Baseline: 150


   Reactive: Yes


   Variability: Moderate


   Decels: No


EXTREMITIES: No cyanosis or edema.


BACK: Nontender without obvious deformity. No CVA tenderness.


NEUROLOGICAL: Awake and alert. Motor and sensory grossly within normal limits. 

Five out of 5 muscle strength in all muscle groups. Normal speech.





Data


Data


Vital Signs Reviewed:  Yes


Orders





 Orders


Vital Signs (Adult) .ON ADMISSION (17 09:13)


^ Labor Status (17 09:13)


^ Non Stress Test (17 09:13)


^ Hydration (17 09:13)


Urinalysis - C+S If Indicated (17 09:40)


Comprehensive Metabolic Panel (17 09:40)


Ob/Psych Drug Screen, Urine (17 09:40)


Drug Screen, Random Urine (17 09:40)


Complete Blood Count With Diff (17 09:40)


Group B Strep:  Positive





MDM


Medical Record Reviewed:  Yes


Interpretation(s)


Patient is a 22-year-old female  at 36 weeks presenting with body aches. 

History of homelessness and IV drug use. Symptoms likely secondary to viral 

illness versus antibiotic side effects versus dehydration. Fetal heart tones 

reassuring. Urine sample appears cloudy, however patient does not have any 

urinary symptoms.





Plan to provide hydration. Will also order urinalysis, in order CBC, CMP, and 

urine drug screen.


Provide Tylenol as needed.


Plan


Tylenol 650 mg PO given x1. 


1L LR IV x1 given.





F/u w/[facility]. Has appointment scheduled 17.


Physician Communication


Plan communicated with Dr. Disla and Dr. Blancas.


Diagnosis


Diagnosis:  


 Primary Impression:  


 36 weeks gestation of pregnancy


Disposition:   DISCHARGE HOME


Condition:  Stable











Hue Ryan MD R1 Dec 18, 2017 09:44

## 2018-01-08 ENCOUNTER — HOSPITAL ENCOUNTER (EMERGENCY)
Dept: HOSPITAL 17 - HOBED | Age: 23
Discharge: HOME | End: 2018-01-08
Payer: MEDICAID

## 2018-01-08 DIAGNOSIS — O47.1: Primary | ICD-10-CM

## 2018-01-08 DIAGNOSIS — O21.9: ICD-10-CM

## 2018-01-08 DIAGNOSIS — O99.013: ICD-10-CM

## 2018-01-08 DIAGNOSIS — O99.333: ICD-10-CM

## 2018-01-08 DIAGNOSIS — Z3A.39: ICD-10-CM

## 2018-01-08 PROCEDURE — 84112 EVAL AMNIOTIC FLUID PROTEIN: CPT

## 2018-01-08 PROCEDURE — 59025 FETAL NON-STRESS TEST: CPT

## 2018-01-08 NOTE — PD
HPI


Chief Complaint


pelvic pressure


Date Seen:  2018


Travel History


International Travel<30 Days:  No


Contact w/Intl Traveler<30Days:  No


Known Affected Area:  No





History of Present Illness


HPI


Ms. Ingram is a 22-year-old G1 at 39 2/7 weeks (per 12/15/2017 ultrasound) who 

presents with complaints of lower pelvic pressure and pain on her sides.  

Patient states that this pain has been present intermittently for the past 

several days; it occurs for approximate 5 minutes at a time before remitting.  

Patient states that pain was significant enough that it awoke her from sleep 

this morning.  Patient does not report any associated pain with urination, fever

, or back pain. 


Patient reports normal fetal movement.  Patient denies vaginal bleeding.  

Patient had some leakage of clear fluid which she is not sure whether it was 

urine.  Patient also reports some whitish discharge but denies any vaginal pain

, itching, or other vaginal symptoms.


Prenatal records reviewed: Patient obtained prenatal labs 12/15/2017.  Labs are 

unremarkable with exception of mild anemia (Hgb ~9, and GBS positive status).  

Ultrasound 12/15 showed borderline high LUCIANA (24.9).  Patient recently treated 

for Trichomonas and bacterial vaginosis; she states she was compliant with this 

treatment


Weeks Gestation:  39


Para:  0


:  1





History


Past Medical History


Medical History:  Denies Significant Hx





Obstetric History


Obstetric History


G1





Past Surgical History


Surgical History:  No Previous Surgery





Family History


*** Narrative Family History


None reported





Social History


Alcohol Use:  No


Tobacco Use:  Yes (unspecified quantity)


Substance Abuse:  No (none reported)





Allergies-Medications


(Allergen,Severity, Reaction):  


Coded Allergies:  


     No Known Allergies (Verified  Allergy, Unknown, 17)


Home Meds


Active Scripts


Metronidazole (Flagyl) 500 Mg Tab, 500 MG PO BID for Infection, #14 TAB 0 

Refills


   Prov:Gardenia Dunham MD R2         12/15/17


Nitrofurantoin Monohydrate Macrocrystals (Macrobid) 100 Mg Cap, 100 MG PO BID 

for Infection for 5 Days, #10 CAP 0 Refills


   Prov:Maged Rodriguez MD R1         12/15/17


Metoclopramide (Reglan) 10 Mg Tab, 10 MG PO QID, #20 TAB 0 Refills


   Prov:Harpreet Mendiola MD         17


Tramadol (Tramadol) 50 Mg Tab, 50 MG PO Q6H Y for PAIN, #20 TAB


   Prov:Lang Encinas MD         17


Ibuprofen (Ibuprofen) 600 Mg Tab, 600 MG PO Q6H Y for Pain/Inflammation, #40 TAB


   Prov:Lang Encinas MD         17


Cephalexin (Cephalexin) 500 Mg Cap, 500 MG PO Q8H for Infection, #21 CAP


   Prov:Lang Encinas MD         17


Sulfamethoxazole-Trimethoprim (Bactrim DS) 800-160 Mg Tab, 1 TAB PO BID for 

Infection, #14 TAB


   Prov:Lang Encinas MD         17





Review of Systems


General / Constitutional:  No: Fever, Chills


Eyes:  No: Blurred Vision


HENT:  No: Headaches


Cardiovascular:  No: Chest Pain or Discomfort


Respiratory:  No: Short of Breath


Gastrointestinal:  Vomiting (x1 episode last night), Abdominal Pain (lower, 

pressure)


Genitourinary:  No: Urgency, Frequency


Skin:  No Rash, No Itching





Physical Exam


Narrative


GENERAL: Well-nourished, well-developed patient.


SKIN: Warm and dry.


HEAD: Normocephalic and atraumatic.


EYES: No scleral icterus. No injection or drainage. 


ENT: No nasal drainage noted. Mucous membranes pink. Airway patent.


NECK: Supple, trachea midline. No JVD.


CARDIOVASCULAR: Regular rate and rhythm without murmurs. 


RESPIRATORY: Breath sounds equal bilaterally. No accessory muscle use.


EXTREMITIES: No cyanosis or edema.


BACK: Nontender without obvious deformity. No CVA tenderness.


NEUROLOGICAL: Awake and alert. Motor and sensory grossly within normal limits. 

Five out of 5 muscle strength in all muscle groups. Normal speech.


ABDOMEN/GI: Abdomen soft, non-tender, bowel sounds present, no rebound, no 

guarding 


   Gravid


GENITOURINARY: 


   External Genitalia: intact and normal in appearance


   Cervix: 


   Dilatation: 0-1        


   Effacement: 0%     


   Station: -3


   Presentation: V       


   Membranes: Intact


   Uterine Contractions: intermittent/occasional


FHT's: 


   Category: 1   


   Baseline: 135  


   Reactive: Y  


   Variability: Mod





Data


Data


Vital Signs Reviewed:  Yes


Group B Strep:  Positive





MDM


Medical Record Reviewed:  Yes


Plan


 22-year-old G1 at 39 2/7 weeks (per 12/15/2017 ultrasound) who presents with 

complaints of lower pelvic pressure and pain on her sides


-Category 1 rhythm


-Occasional contractions on CTG


-Cervix 0-1/0%/-3


-PE benign





Plan:


-Monitor fetal HR on EFM


-Due to concern for possible are within, Amnisure obtained   





Interval:


Amnisure negative


Cat 1 rhythm persistent


Occasional contractions on EFM; >10 min separation





Updated plan


-Patient reassured she is not in labor at this time; she was counseled to 

return to OB ED with increasing frequency of contractions or any other concerns


Diagnosis


Diagnosis:  


 Primary Impression:  


 [place] contractions


Disposition:  01 DISCHARGE HOME


Condition:  Stable


Patient Instructions:  Abdominal Pain in Pregnancy (ED), Fetal Movement (ED), 

General Instructions











Luis Miguel Reyes MD, R3 2018 13:22

## 2018-01-12 ENCOUNTER — HOSPITAL ENCOUNTER (EMERGENCY)
Dept: HOSPITAL 17 - HOBED | Age: 23
Discharge: HOME | End: 2018-01-12
Payer: MEDICAID

## 2018-01-12 DIAGNOSIS — D64.9: ICD-10-CM

## 2018-01-12 DIAGNOSIS — O99.013: ICD-10-CM

## 2018-01-12 DIAGNOSIS — Z3A.40: ICD-10-CM

## 2018-01-12 DIAGNOSIS — O47.1: Primary | ICD-10-CM

## 2018-01-12 PROCEDURE — 87210 SMEAR WET MOUNT SALINE/INK: CPT

## 2018-01-12 PROCEDURE — 59025 FETAL NON-STRESS TEST: CPT

## 2018-01-12 NOTE — PD
HPI


Chief Complaint


abdominal pain; contractions?


Date Seen:  Jan 12, 2018


Travel History


International Travel<30 Days:  No


Contact w/Intl Traveler<30Days:  No


Known Affected Area:  No





History of Present Illness


HPI


Ms. Ingram is a 22-year-old G1 at 40 1/7 weeks (per 12/15/2017 ultrasound) who 

presents with complaints of R sided abdominal pain.


Patient reports that she has been having intermittent abdominal pain every few 

minutes since this morning; patient states that it is predominantly right-

sided. Patient reports some whitish vaginal discharge but denies any loss of 

fluid.  No reported pain with urination.  Patient has had chronic urinary 

frequency during pregnancy.  No fever or chills.  Patient reports shortness of 

breath for the past several weeks; this has not recently changed and patient 

has had some periods without shortness of breath. No associated cough, chest 

pain, or leg swelling.No nausea or vomiting.   


Patient reports normal fetal movement.  Patient denies vaginal bleeding.  

Patient had some leakage of clear fluid which she is not sure whether it was 

urine.  Patient also reports some whitish discharge but denies any vaginal pain

, itching, or other vaginal symptoms.


Prenatal records reviewed: Patient obtained prenatal labs 12/15/2017.  Labs are 

unremarkable with exception of mild anemia (Hgb ~9, and GBS positive status).  

Ultrasound 12/15 showed borderline high LUCIANA (24.9).  Patient recently treated 

for Trichomonas and bacterial vaginosis; she states she was compliant with this 

treatment





History


Past Medical History


Medical History:  Denies Significant Hx





Obstetric History


Obstetric History


G1





Past Surgical History


Surgical History:  No Previous Surgery





Family History


Family History:  Negative





Allergies-Medications


(Allergen,Severity, Reaction):  


Coded Allergies:  


     No Known Allergies (Verified  Allergy, Unknown, 12/18/17)


Home Meds


Active Scripts


Metronidazole (Flagyl) 500 Mg Tab, 500 MG PO BID for Infection, #14 TAB 0 

Refills


   Prov:Gardenia Dunham MD R2         12/15/17


Nitrofurantoin Monohydrate Macrocrystals (Macrobid) 100 Mg Cap, 100 MG PO BID 

for Infection for 5 Days, #10 CAP 0 Refills


   Prov:Maged Rodriguez MD R1         12/15/17


Metoclopramide (Reglan) 10 Mg Tab, 10 MG PO QID, #20 TAB 0 Refills


   Prov:Harpreet Mendiola MD         7/7/17


Tramadol (Tramadol) 50 Mg Tab, 50 MG PO Q6H Y for PAIN, #20 TAB


   Prov:Lang Encinas MD         6/22/17


Ibuprofen (Ibuprofen) 600 Mg Tab, 600 MG PO Q6H Y for Pain/Inflammation, #40 TAB


   Prov:Lang Encinas MD         6/22/17


Cephalexin (Cephalexin) 500 Mg Cap, 500 MG PO Q8H for Infection, #21 CAP


   Prov:Lang Encinas MD         6/22/17


Sulfamethoxazole-Trimethoprim (Bactrim DS) 800-160 Mg Tab, 1 TAB PO BID for 

Infection, #14 TAB


   Prov:Lang Encinas MD         6/22/17





Review of Systems


General / Constitutional:  No: Fever


Eyes:  No: Blurred Vision


HENT:  No: Headaches


Cardiovascular:  No: Chest Pain or Discomfort


Respiratory:  Short of Breath (intermittent)


Gastrointestinal:  Abdominal Pain (right sided), No: Nausea, Vomiting


Genitourinary:  Other (some vaginal discharge, whitish), No: Urgency, Dysuria, 

Vaginal Bleeding


Skin:  No Rash


Psychiatric:  No: Anxiety, Depression





Physical Exam


/82


T98.2


HR 89


O2 sat 100%


Narrative


GENERAL: Well-nourished, well-developed patient.


SKIN: Warm and dry.


HEAD: Normocephalic and atraumatic.


EYES: No scleral icterus. No injection or drainage. 


ENT: No nasal drainage noted. Mucous membranes pink. Airway patent.


NECK: Supple, trachea midline. No JVD.


CARDIOVASCULAR: Regular rate and rhythm without murmurs  


RESPIRATORY: CTAB; normal rate


ABDOMEN/GI: Abdomen soft, non-tender, bowel sounds present, no rebound, no 

guarding 


   Gravid


EXTREMITIES: No cyanosis or edema.


BACK: Nontender without obvious deformity. No CVA tenderness.


NEUROLOGICAL: Awake and alert. Motor and sensory function grossly within normal 

limits.  


GENITOURINARY: 


   External Genitalia: intact and normal in appearance


   Cervix: 


   Dilatation: 1cm          


   Effacement: 70%       


   Station: -3 


   Presentation: V      


   Membranes: Intact


   Uterine Contractions: q10min


FHT's: 


   Category: 1  


   Baseline: 120  


   Reactive: Y  


   Variability: Mod 


   Decels: None





MDM


Medical Record Reviewed:  Yes


Narrative Course / MDM


Ms. Ingram is a 22-year-old G1 at 40 1/7 weeks (per 12/15/2017 ultrasound) who 

presents with complaints of right sided abdominal pain


-Category 1 rhythm


- contractions q10min on CTG


-Cervix 1/70%/-3


-PE benign


-Reported vaginal discharge in the history of Trichomonas (treated, no BRIELLE)


Plan:


-Monitor fetal HR on EFM


-Will obtain wet prep





Interval: 


-Fetal HR reactive, reassuring for duration of EFM (>20min)


-Contractions remained q10min





Updated Assessment/Plan:


Impression: Suspect early labor 


-Will schedule patient for an induction of labor; patient scheduled for 1/15 at 

6am


-Patient agrees to return to OB ED with any concerns for fetal movement, 

increasing frequency of contractions, or other concerns


-Will follow-up wet prep and call patient after resulted


Diagnosis


Diagnosis:  


 Primary Impression:  


 40 weeks gestation of pregnancy


 Additional Impression:  


 Uterine contractions during pregnancy


Disposition:  01 DISCHARGE HOME


Condition:  Stable


Patient Instructions:  General Instructions, Abdominal Pain in Pregnancy (ED), 

Fetal Movement (ED)


Departure Forms:  Tests/Procedures











Luis Miguel Reyes MD, R3 Jan 12, 2018 18:02

## 2018-01-15 ENCOUNTER — HOSPITAL ENCOUNTER (INPATIENT)
Dept: HOSPITAL 17 - H2EB | Age: 23
LOS: 4 days | Discharge: HOME | End: 2018-01-19
Attending: SPECIALIST | Admitting: SPECIALIST
Payer: MEDICAID

## 2018-01-15 VITALS — HEART RATE: 78 BPM | SYSTOLIC BLOOD PRESSURE: 118 MMHG | DIASTOLIC BLOOD PRESSURE: 82 MMHG

## 2018-01-15 VITALS — RESPIRATION RATE: 18 BRPM

## 2018-01-15 VITALS — DIASTOLIC BLOOD PRESSURE: 79 MMHG | SYSTOLIC BLOOD PRESSURE: 118 MMHG | HEART RATE: 65 BPM

## 2018-01-15 VITALS — HEART RATE: 101 BPM | DIASTOLIC BLOOD PRESSURE: 90 MMHG | SYSTOLIC BLOOD PRESSURE: 135 MMHG

## 2018-01-15 VITALS — WEIGHT: 180.78 LBS | HEIGHT: 62 IN | BODY MASS INDEX: 33.27 KG/M2

## 2018-01-15 VITALS — SYSTOLIC BLOOD PRESSURE: 115 MMHG | DIASTOLIC BLOOD PRESSURE: 70 MMHG | HEART RATE: 66 BPM

## 2018-01-15 VITALS — TEMPERATURE: 98.1 F | RESPIRATION RATE: 18 BRPM

## 2018-01-15 VITALS — HEART RATE: 63 BPM | DIASTOLIC BLOOD PRESSURE: 68 MMHG | SYSTOLIC BLOOD PRESSURE: 112 MMHG

## 2018-01-15 VITALS — SYSTOLIC BLOOD PRESSURE: 110 MMHG | HEART RATE: 89 BPM | DIASTOLIC BLOOD PRESSURE: 55 MMHG

## 2018-01-15 VITALS — SYSTOLIC BLOOD PRESSURE: 120 MMHG | DIASTOLIC BLOOD PRESSURE: 85 MMHG | HEART RATE: 71 BPM

## 2018-01-15 VITALS — DIASTOLIC BLOOD PRESSURE: 76 MMHG | SYSTOLIC BLOOD PRESSURE: 115 MMHG | HEART RATE: 64 BPM

## 2018-01-15 VITALS — HEART RATE: 68 BPM | SYSTOLIC BLOOD PRESSURE: 109 MMHG | DIASTOLIC BLOOD PRESSURE: 71 MMHG

## 2018-01-15 VITALS — TEMPERATURE: 98 F | RESPIRATION RATE: 18 BRPM

## 2018-01-15 VITALS — SYSTOLIC BLOOD PRESSURE: 129 MMHG | DIASTOLIC BLOOD PRESSURE: 88 MMHG | HEART RATE: 86 BPM

## 2018-01-15 VITALS — HEART RATE: 97 BPM | SYSTOLIC BLOOD PRESSURE: 135 MMHG | DIASTOLIC BLOOD PRESSURE: 98 MMHG

## 2018-01-15 VITALS — DIASTOLIC BLOOD PRESSURE: 75 MMHG | SYSTOLIC BLOOD PRESSURE: 126 MMHG | HEART RATE: 87 BPM

## 2018-01-15 VITALS — DIASTOLIC BLOOD PRESSURE: 81 MMHG | SYSTOLIC BLOOD PRESSURE: 124 MMHG | HEART RATE: 73 BPM

## 2018-01-15 VITALS — DIASTOLIC BLOOD PRESSURE: 75 MMHG | SYSTOLIC BLOOD PRESSURE: 118 MMHG | HEART RATE: 102 BPM

## 2018-01-15 VITALS — SYSTOLIC BLOOD PRESSURE: 125 MMHG | DIASTOLIC BLOOD PRESSURE: 92 MMHG | HEART RATE: 67 BPM

## 2018-01-15 VITALS — HEART RATE: 73 BPM

## 2018-01-15 VITALS — DIASTOLIC BLOOD PRESSURE: 78 MMHG | SYSTOLIC BLOOD PRESSURE: 122 MMHG | HEART RATE: 74 BPM

## 2018-01-15 VITALS — HEART RATE: 85 BPM | DIASTOLIC BLOOD PRESSURE: 95 MMHG | SYSTOLIC BLOOD PRESSURE: 140 MMHG

## 2018-01-15 VITALS — HEART RATE: 64 BPM | SYSTOLIC BLOOD PRESSURE: 114 MMHG | DIASTOLIC BLOOD PRESSURE: 82 MMHG

## 2018-01-15 VITALS
HEART RATE: 73 BPM | TEMPERATURE: 98.2 F | RESPIRATION RATE: 18 BRPM | DIASTOLIC BLOOD PRESSURE: 78 MMHG | SYSTOLIC BLOOD PRESSURE: 112 MMHG

## 2018-01-15 VITALS — SYSTOLIC BLOOD PRESSURE: 115 MMHG | HEART RATE: 65 BPM | DIASTOLIC BLOOD PRESSURE: 71 MMHG

## 2018-01-15 VITALS — SYSTOLIC BLOOD PRESSURE: 115 MMHG | HEART RATE: 61 BPM | DIASTOLIC BLOOD PRESSURE: 80 MMHG

## 2018-01-15 VITALS — HEART RATE: 74 BPM

## 2018-01-15 VITALS — SYSTOLIC BLOOD PRESSURE: 122 MMHG | DIASTOLIC BLOOD PRESSURE: 76 MMHG | HEART RATE: 70 BPM

## 2018-01-15 VITALS — HEART RATE: 66 BPM

## 2018-01-15 VITALS — HEART RATE: 81 BPM

## 2018-01-15 VITALS — DIASTOLIC BLOOD PRESSURE: 78 MMHG | SYSTOLIC BLOOD PRESSURE: 113 MMHG | HEART RATE: 70 BPM

## 2018-01-15 VITALS — HEART RATE: 17 BPM

## 2018-01-15 VITALS — RESPIRATION RATE: 18 BRPM | TEMPERATURE: 98.2 F

## 2018-01-15 DIAGNOSIS — F17.210: ICD-10-CM

## 2018-01-15 DIAGNOSIS — Z3A.40: ICD-10-CM

## 2018-01-15 DIAGNOSIS — B37.3: ICD-10-CM

## 2018-01-15 DIAGNOSIS — A74.9: ICD-10-CM

## 2018-01-15 DIAGNOSIS — O32.4XX0: ICD-10-CM

## 2018-01-15 DIAGNOSIS — F12.90: ICD-10-CM

## 2018-01-15 LAB
BACTERIA #/AREA URNS HPF: (no result) /HPF
BASOPHILS # BLD AUTO: 0 TH/MM3 (ref 0–0.2)
BASOPHILS NFR BLD: 0.5 % (ref 0–2)
COLOR UR: YELLOW
EOSINOPHIL # BLD: 0.1 TH/MM3 (ref 0–0.4)
EOSINOPHIL NFR BLD: 1.3 % (ref 0–4)
ERYTHROCYTE [DISTWIDTH] IN BLOOD BY AUTOMATED COUNT: 19.7 % (ref 11.6–17.2)
GLUCOSE UR STRIP-MCNC: (no result) MG/DL
HCT VFR BLD CALC: 28.9 % (ref 35–46)
HGB BLD-MCNC: 9.4 GM/DL (ref 11.6–15.3)
HGB UR QL STRIP: (no result)
KETONES UR STRIP-MCNC: (no result) MG/DL
LYMPHOCYTES # BLD AUTO: 1.8 TH/MM3 (ref 1–4.8)
LYMPHOCYTES NFR BLD AUTO: 24.1 % (ref 9–44)
MCH RBC QN AUTO: 24.8 PG (ref 27–34)
MCHC RBC AUTO-ENTMCNC: 32.4 % (ref 32–36)
MCV RBC AUTO: 76.7 FL (ref 80–100)
MONOCYTE #: 0.7 TH/MM3 (ref 0–0.9)
MONOCYTES NFR BLD: 9.1 % (ref 0–8)
MUCOUS THREADS #/AREA URNS LPF: (no result) /LPF
NEUTROPHILS # BLD AUTO: 4.9 TH/MM3 (ref 1.8–7.7)
NEUTROPHILS NFR BLD AUTO: 65 % (ref 16–70)
NITRITE UR QL STRIP: (no result)
PLATELET # BLD: 293 TH/MM3 (ref 150–450)
PMV BLD AUTO: 8.2 FL (ref 7–11)
RBC # BLD AUTO: 3.77 MIL/MM3 (ref 4–5.3)
SP GR UR STRIP: 1.01 (ref 1–1.03)
SQUAMOUS #/AREA URNS HPF: 9 /HPF (ref 0–5)
URINE LEUKOCYTE ESTERASE: (no result)
WBC # BLD AUTO: 7.5 TH/MM3 (ref 4–11)
WHITE BLOOD CELL CLUMPS: (no result)

## 2018-01-15 PROCEDURE — 3E0P7VZ INTRODUCTION OF HORMONE INTO FEMALE REPRODUCTIVE, VIA NATURAL OR ARTIFICIAL OPENING: ICD-10-PCS | Performed by: SPECIALIST

## 2018-01-15 PROCEDURE — 3E0R3BZ INTRODUCTION OF ANESTHETIC AGENT INTO SPINAL CANAL, PERCUTANEOUS APPROACH: ICD-10-PCS | Performed by: ANESTHESIOLOGY

## 2018-01-15 PROCEDURE — G0481 DRUG TEST DEF 8-14 CLASSES: HCPCS

## 2018-01-15 PROCEDURE — 87086 URINE CULTURE/COLONY COUNT: CPT

## 2018-01-15 PROCEDURE — 90715 TDAP VACCINE 7 YRS/> IM: CPT

## 2018-01-15 PROCEDURE — 10H07YZ INSERTION OF OTHER DEVICE INTO PRODUCTS OF CONCEPTION, VIA NATURAL OR ARTIFICIAL OPENING: ICD-10-PCS | Performed by: OBSTETRICS & GYNECOLOGY

## 2018-01-15 PROCEDURE — 81001 URINALYSIS AUTO W/SCOPE: CPT

## 2018-01-15 PROCEDURE — 80307 DRUG TEST PRSMV CHEM ANLYZR: CPT

## 2018-01-15 PROCEDURE — 59025 FETAL NON-STRESS TEST: CPT

## 2018-01-15 PROCEDURE — 87591 N.GONORRHOEAE DNA AMP PROB: CPT

## 2018-01-15 PROCEDURE — 85025 COMPLETE CBC W/AUTO DIFF WBC: CPT

## 2018-01-15 PROCEDURE — 87491 CHLMYD TRACH DNA AMP PROBE: CPT

## 2018-01-15 PROCEDURE — 87210 SMEAR WET MOUNT SALINE/INK: CPT

## 2018-01-15 PROCEDURE — 00HU33Z INSERTION OF INFUSION DEVICE INTO SPINAL CANAL, PERCUTANEOUS APPROACH: ICD-10-PCS | Performed by: ANESTHESIOLOGY

## 2018-01-15 RX ADMIN — PENICILLIN G POTASSIUM SCH MLS/HR: 5000000 INJECTION, POWDER, FOR SOLUTION INTRAMUSCULAR; INTRAVENOUS at 15:00

## 2018-01-15 RX ADMIN — PENICILLIN G POTASSIUM SCH MLS/HR: 5000000 INJECTION, POWDER, FOR SOLUTION INTRAMUSCULAR; INTRAVENOUS at 11:00

## 2018-01-15 RX ADMIN — OXYTOCIN SCH MLS/HR: 10 INJECTION, SOLUTION INTRAMUSCULAR; INTRAVENOUS at 08:46

## 2018-01-15 RX ADMIN — OXYTOCIN SCH MLS/HR: 10 INJECTION, SOLUTION INTRAMUSCULAR; INTRAVENOUS at 22:52

## 2018-01-15 NOTE — PD.LABORPN
Subjective


Subjective


Patient feeling painful contractions.





Objective


Vital Signs





Vital Signs








  Date Time  Temp Pulse Resp B/P (MAP) Pulse Ox O2 Delivery O2 Flow Rate FiO2


 


1/15/18 14:30  73 18 112/78 (89)    


 


1/15/18 14:30 98.2       


 


1/15/18 13:30  66      


 


1/15/18 12:00 98.2  18     


 


1/15/18 11:50  70  113/78 (90)    








Objective


Pelvic Exam:   


   Cervix: posterior


   Dilatation: 1          


   Effacement: 50          


   Station: -2  


   Presentation: vertex        


   Membranes: SROM, light mec


   Uterine Contractions: q1-3min


FHT's: 


   Category: II   


   Baseline: 130   


   Reactive: +   


   Variability: moderate  


   Decels: prolonged deceleration





Assessment/Plan


Assessment and Plan


22 year old  at 40-4/7 weeks gestation.





1. IUP- Category II tracing. Position change with resolution of fetal 

bradycardia. Continue to monitor.


2. IOL at term- SROM. IUPC placed. Expectant management and close monitoring. 


3. Vaginal discharge- wet prep positive for BV and yeast vaginitis- will treat 

with Flagyl 500mg PO BID x 7 days and Diflucan 150mg PO Once.  Positive for 

chlamydia- s/p Rocephin 250mg IM once and Azithromycin 1000mg PO once.


4. GBS positive- Penicillin per protocol.


5. Poor prenatal care- prenatal labs reviewed from 12/15. UDS positive for 

cannabinoids.


6. Anticipate vaginal delivery.





Ebony Burton Dr., MD, R3 Rudi 15, 2018 16:15

## 2018-01-15 NOTE — HHI.HP
HPI


Chief Complaint


Induction of labor


Date Seen:  Rudi 15, 2018


Travel History


International Travel<30 Days:  No


Contact w/Intl Traveler<30Days:  No





History of Present Illness


HPI


Patient is a 22 year old  at 40-4/7 weeks gestation based on third 

trimester US who presents today for induction of labor.  She denies any vaginal 

bleeding or discharge.  No gush or leaking of fluid.  Positive fetal movement.  

Of note, she has had poor prenatal care.  Her first ultrasound was performed on 

12/15/17 which gave an LUCIO of 18.





History


Past Medical History


Medical History:  Denies Significant Hx





Obstetric History


Obstetric History








Past Surgical History


Surgical History:  No Previous Surgery





Family History


Family History:  Negative





Social History


Alcohol Use:  No


Tobacco Use:  Yes (1 cigarette per day)


Substance Abuse:  Yes (marijuana use)





Allergies-Medications


(Allergen,Severity, Reaction):  


Coded Allergies:  


     No Known Allergies (Verified  Allergy, Unknown, 17)


Home Meds


Active Scripts


Metronidazole (Flagyl) 500 Mg Tab, 500 MG PO BID for Infection, #14 TAB 0 

Refills


   Prov:Gardenia Dunham MD R2         12/15/17


Nitrofurantoin Monohydrate Macrocrystals (Macrobid) 100 Mg Cap, 100 MG PO BID 

for Infection for 5 Days, #10 CAP 0 Refills


   Prov:Maged Rodriguez MD R1         12/15/17


Metoclopramide (Reglan) 10 Mg Tab, 10 MG PO QID, #20 TAB 0 Refills


   Prov:Harpreet Mendiola MD         17


Tramadol (Tramadol) 50 Mg Tab, 50 MG PO Q6H Y for PAIN, #20 TAB


   Prov:Lang Encinas MD         17


Ibuprofen (Ibuprofen) 600 Mg Tab, 600 MG PO Q6H Y for Pain/Inflammation, #40 TAB


   Prov:Lang Encinas MD         17


Cephalexin (Cephalexin) 500 Mg Cap, 500 MG PO Q8H for Infection, #21 CAP


   Prov:Lang Encinas MD         17


Sulfamethoxazole-Trimethoprim (Bactrim DS) 800-160 Mg Tab, 1 TAB PO BID for 

Infection, #14 TAB


   Prov:Lang Encinas MD         17





Review of Systems


Except as stated in HPI:  all other systems reviewed are Neg


General / Constitutional:  No: Fever, Chills


Eyes:  No: Blurred Vision, Visual changes


HENT:  No: Headaches


Cardiovascular:  No: Chest Pain or Discomfort, Palpitations


Respiratory:  No: Cough, Short of Breath


Gastrointestinal:  No: Nausea, Vomiting


Genitourinary:  No: Dysuria, Hematuria, Pelvic Pain, Discharge, Vaginal Bleeding


Musculoskeletal:  No: Edema


Neurologic:  No: Headache


Psychiatric:  Substance Abuse





Physical Exam


Narrative


GENERAL: Well-nourished, well-developed patient.


SKIN: Warm and dry.


HEAD: Normocephalic and atraumatic.


EYES: No scleral icterus. No injection or drainage. 


ENT: No nasal drainage noted. Mucous membranes pink. Airway patent.


NECK: Supple, trachea midline. No JVD.


CARDIOVASCULAR: Regular rate and rhythm without murmurs, gallops, or rubs. 


RESPIRATORY: Breath sounds equal bilaterally. No accessory muscle use.


ABDOMEN/GI: Abdomen soft, non-tender, bowel sounds present, no rebound, no 

guarding 


   Gravid to 40 weeks size


GENITOURINARY: 


   External Genitalia: intact and normal in appearance


   BUS glands: normal


   Cervix: anterior


   Dilatation: 1          


   Effacement: 0          


   Station: -2  


   Presentation: vertex        


   Membranes: intact


   Uterine Contractions: none


FHT's: 


   Category: I   


   Baseline: 130   


   Reactive: +   


   Variability: moderate  


   Decels: none  


EXTREMITIES: No cyanosis or edema.


BACK: Nontender without obvious deformity. 


NEUROLOGICAL: Awake and alert. Motor and sensory grossly within normal limits.  

Normal speech.





Caprini VTE Risk Assessment


Caprini VTE Risk Assessment:  No/Low Risk (score <= 1)


Caprini Risk Assessment Model











 Point Value = 1          Point Value = 2  Point Value = 3  Point Value = 5


 


Age 41-60


Minor surgery


BMI > 25 kg/m2


Swollen legs


Varicose veins


Pregnancy or postpartum


History of unexplained or recurrent


   spontaneous 


Oral contraceptives or hormone


   replacement


Sepsis (< 1 month)


Serious lung disease, including


   pneumonia (< 1 month)


Abnormal pulmonary function


Acute myocardial infarction


Congestive heart failure (< 1 month)


History of inflammatory bowel disease


Medical patient at bed rest Age 61-74


Arthroscopic surgery


Major open surgery (> 45 min)


Laparoscopic surgery (> 45 min)


Malignancy


Confined to bed (> 72 hours)


Immobilizing plaster cast


Central venous access Age >= 75


History of VTE


Family history of VTE


Factor V Leiden


Prothrombin 93257O


Lupus anticoagulant


Anticardiolipin antibodies


Elevated serum homocysteine


Heparin-induced thrombocytopenia


Other congenital or acquired


   thrombophilia Stroke (< 1 month)


Elective arthroplasty


Hip, pelvis, or leg fracture


Acute spinal cord injury (< 1 month)








Prophylaxis Regimen











   Total Risk


Factor Score Risk Level Prophylaxis Regimen


 


0-1      Low Early ambulation


 


2 Moderate Order ONE of the following:


*Sequential Compression Device (SCD)


*Heparin 5000 units SQ BID


 


3-4 Higher Order ONE of the following medications:


*Heparin 5000 units SQ TID


*Enoxaparin/Lovenox 40 mg SQ daily (WT < 150 kg, CrCl > 30 mL/min)


*Enoxaparin/Lovenox 30 mg SQ daily (WT < 150 kg, CrCl > 10-29 mL/min)


*Enoxaparin/Lovenox 30 mg SQ BID (WT < 150 kg, CrCl > 30 mL/min)


AND/OR


*Sequential Compression Device (SCD)


 


5 or more Highest Order ONE of the following medications:


*Heparin 5000 units SQ TID (Preferred with Epidurals)


*Enoxaparin/Lovenox 40 mg SQ daily (WT < 150 kg, CrCl > 30 mL/min)


*Enoxaparin/Lovenox 30 mg SQ daily (WT < 150 kg, CrCl > 10-29 mL/min)


*Enoxaparin/Lovenox 30 mg SQ BID (WT < 150 kg, CrCl > 30 mL/min)


AND


*Sequential Compression Device (SCD)











Data


Data


Vital Signs Reviewed:  Yes


Orders





 Orders


Admit To Inpatient (1/15/18 )


Code Status (1/15/18 06:52)


Vital Signs (Adult) .Per protocol (1/15/18 06:52)


Activity Oob Ad Poppy (1/15/18 06:52)


Fetal Heart (1/15/18 06:52)


Amnioinfusion (1/15/18 06:52)


Urinary Catheter Management .ONCE (1/15/18 06:52)


Diet Liquid (1/15/18 Breakfast)


Lactated Ringer's 1000 Ml Inj (Lr 1000 M (1/15/18 06:52)


Lactated Ringer's 1000 Ml Inj (Lr 1000 M (1/15/18 06:52)


Sodium Chlorid 0.9% 500 Ml Inj (Ns 500 M (1/15/18 07:00)


Sodium Chlor 0.9% 1000 Ml Inj (Ns 1000 M (1/15/18 07:12)


Lidocaine 1% Inj (50 Ml) (Xylocaine 1% I (1/15/18 07:00)


Citric Acid-Sodium Citrate Liq (Bicitra (1/15/18 07:00)


Ondansetron Inj (Zofran Inj) (1/15/18 07:00)


Fentanyl Inj (Fentanyl Inj) (1/15/18 07:00)


Fentanyl Inj (Fentanyl Inj) (1/15/18 07:00)


Penicillin G Potassium Inj (Pfizerpen-G (1/15/18 07:00)


Penicillin G Potassium Inj (Pfizerpen-G (1/15/18 11:00)


Complete Blood Count With Diff (1/15/18 06:52)


Hold Clot (1/15/18 06:52)


Abo/Rh Blood Type (1/15/18 06:52)


Urinalysis - C+S If Indicated (1/15/18 06:52)


Drug Screen, Random Urine (1/15/18 06:52)


Resp Oxygen Non Rebreathe Mask (1/15/18 )


^ Epidural / Intrathecal Infus (1/15/18 06:52)


Oxytocin 30 Units-500ml Premix (Pitocin (1/15/18 07:00)


Lidocaine 1% Inj (50 Ml) (Xylocaine 1% I (1/15/18 07:00)


Light Mineral Oil (Muri-Lube Oil) (1/15/18 07:00)


Inpatient Certification (1/15/18 )


Specimen To Be Collected PRN (1/15/18 06:52)


Specimen To Be Collected PRN (1/15/18 06:52)


Group B Strep:  Positive





Assessment/Plan


Assessment and Plan


22 year old  at 40-4/7 weeks gestation.





1. IUP- Category I tracing, reassuring.


2. IOL at term- Cervical ripening with Cytotec.


3. GBS positive- Penicillin per protocol.


4. Poor prenatal care- prenatal labs reviewed from 12/15. Obtain UDS.


5. Anticipate vaginal delivery.





john Ross,Ebony EDWARDS MD, R3 Rudi 15, 2018 07:00

## 2018-01-15 NOTE — PD.LABORPN
Subjective


Subjective


Patient is feeling painful contractions.





Objective


Vital Signs





Vital Signs








  Date Time  Temp Pulse Resp B/P (MAP) Pulse Ox O2 Delivery O2 Flow Rate FiO2


 


1/15/18 12:00 98.2  18     


 


1/15/18 11:50  70  113/78 (90)    


 


1/15/18 08:00 98.1  18     


 


1/15/18 07:53  68  109/71 (84)    








Objective


Pelvic Exam:   


   Cervix: midposition


   Dilatation: 1          


   Effacement: 50          


   Station: -2  


   Presentation: vertex        


   Membranes: intact


   Uterine Contractions: q1-2min


FHT's: 


   Category: II   


   Baseline: 145   


   Reactive: +   


   Variability: moderate  


   Decels: prolonged deceleration with intermittent variable decelerations





Assessment/Plan


Assessment and Plan


22 year old  at 40-4/7 weeks gestation.





1. IUP- Category II tracing. Position change and IV fluids administered with 

resolution of fetal bradycardia. Continue to monitor.


2. IOL at term- Will hold any further ripening/augmentation at this time given 

current fetal heart tracing. Expectant management and close monitoring.


3. Vaginal discharge- wet prep positive for BV and yeast vaginitis- will treat 

with Flagyl 500mg PO BID x 7 days and Diflucan 150mg PO Once.  Positive for 

chlamydia- will treat with Rocephin 250mg IM once and Azithromycin 1000mg PO 

once.


4. GBS positive- Penicillin per protocol.


5. Poor prenatal care- prenatal labs reviewed from 12/15. Obtain UDS.


6. Anticipate vaginal delivery.





Ebony Burton Dr., MD, R3 Rudi 15, 2018 13:37

## 2018-01-15 NOTE — PD.LABORPN
Subjective


Subjective


Patient resting in bed comfortably.





Objective


Vital Signs





Vital Signs








  Date Time  Temp Pulse Resp B/P (MAP) Pulse Ox O2 Delivery O2 Flow Rate FiO2


 


1/15/18 07:53  68  109/71 (84)    








Objective


Pelvic Exam:   


Copious white, greenish curdly discharge noted on exam.


   Cervix: anterior


   Dilatation: 1          


   Effacement: 0          


   Station: -2  


   Presentation: vertex        


   Membranes: intact


   Uterine Contractions: none


FHT's: 


   Category: I   


   Baseline: 130   


   Reactive: +   


   Variability: moderate  


   Decels: none





Assessment/Plan


Assessment and Plan


22 year old  at 40-4/7 weeks gestation.





1. IUP- Category I tracing, reassuring.


2. IOL at term- Cervical ripening with Cytotec.


3. Vaginal discharge- obtain wet prep and GC and chlamydia


4. GBS positive- Penicillin per protocol.


5. Poor prenatal care- prenatal labs reviewed from 12/15. Obtain UDS.


6. Anticipate vaginal delivery.





Ebony Saldaña Dr., MD, R3 Rudi 15, 2018 08:56

## 2018-01-16 VITALS — HEART RATE: 70 BPM | SYSTOLIC BLOOD PRESSURE: 124 MMHG | RESPIRATION RATE: 16 BRPM | DIASTOLIC BLOOD PRESSURE: 87 MMHG

## 2018-01-16 VITALS — DIASTOLIC BLOOD PRESSURE: 79 MMHG | HEART RATE: 77 BPM | SYSTOLIC BLOOD PRESSURE: 109 MMHG

## 2018-01-16 VITALS — SYSTOLIC BLOOD PRESSURE: 105 MMHG | HEART RATE: 76 BPM | DIASTOLIC BLOOD PRESSURE: 66 MMHG | TEMPERATURE: 98.3 F

## 2018-01-16 VITALS — HEART RATE: 82 BPM | SYSTOLIC BLOOD PRESSURE: 113 MMHG | DIASTOLIC BLOOD PRESSURE: 92 MMHG

## 2018-01-16 VITALS — RESPIRATION RATE: 20 BRPM | TEMPERATURE: 98.5 F

## 2018-01-16 VITALS — HEART RATE: 86 BPM | DIASTOLIC BLOOD PRESSURE: 68 MMHG | SYSTOLIC BLOOD PRESSURE: 111 MMHG

## 2018-01-16 VITALS — DIASTOLIC BLOOD PRESSURE: 102 MMHG | HEART RATE: 69 BPM | SYSTOLIC BLOOD PRESSURE: 119 MMHG

## 2018-01-16 VITALS — HEART RATE: 76 BPM

## 2018-01-16 VITALS — DIASTOLIC BLOOD PRESSURE: 83 MMHG | SYSTOLIC BLOOD PRESSURE: 133 MMHG | HEART RATE: 70 BPM

## 2018-01-16 VITALS — SYSTOLIC BLOOD PRESSURE: 124 MMHG | HEART RATE: 74 BPM | DIASTOLIC BLOOD PRESSURE: 76 MMHG

## 2018-01-16 VITALS — HEART RATE: 80 BPM | DIASTOLIC BLOOD PRESSURE: 83 MMHG | SYSTOLIC BLOOD PRESSURE: 124 MMHG

## 2018-01-16 VITALS — SYSTOLIC BLOOD PRESSURE: 127 MMHG | HEART RATE: 63 BPM | DIASTOLIC BLOOD PRESSURE: 72 MMHG

## 2018-01-16 VITALS — SYSTOLIC BLOOD PRESSURE: 123 MMHG | HEART RATE: 73 BPM | DIASTOLIC BLOOD PRESSURE: 79 MMHG

## 2018-01-16 VITALS — SYSTOLIC BLOOD PRESSURE: 122 MMHG | HEART RATE: 83 BPM | DIASTOLIC BLOOD PRESSURE: 87 MMHG

## 2018-01-16 VITALS — DIASTOLIC BLOOD PRESSURE: 83 MMHG | SYSTOLIC BLOOD PRESSURE: 114 MMHG | HEART RATE: 70 BPM

## 2018-01-16 VITALS — HEART RATE: 70 BPM | DIASTOLIC BLOOD PRESSURE: 81 MMHG | RESPIRATION RATE: 16 BRPM | SYSTOLIC BLOOD PRESSURE: 112 MMHG

## 2018-01-16 VITALS — DIASTOLIC BLOOD PRESSURE: 63 MMHG | SYSTOLIC BLOOD PRESSURE: 110 MMHG | HEART RATE: 76 BPM

## 2018-01-16 VITALS
HEART RATE: 83 BPM | RESPIRATION RATE: 21 BRPM | OXYGEN SATURATION: 100 % | SYSTOLIC BLOOD PRESSURE: 129 MMHG | DIASTOLIC BLOOD PRESSURE: 67 MMHG

## 2018-01-16 VITALS — HEART RATE: 77 BPM | DIASTOLIC BLOOD PRESSURE: 79 MMHG | SYSTOLIC BLOOD PRESSURE: 124 MMHG

## 2018-01-16 VITALS
SYSTOLIC BLOOD PRESSURE: 129 MMHG | HEART RATE: 91 BPM | RESPIRATION RATE: 22 BRPM | DIASTOLIC BLOOD PRESSURE: 77 MMHG | OXYGEN SATURATION: 100 %

## 2018-01-16 VITALS — SYSTOLIC BLOOD PRESSURE: 106 MMHG | DIASTOLIC BLOOD PRESSURE: 77 MMHG | HEART RATE: 86 BPM

## 2018-01-16 VITALS — SYSTOLIC BLOOD PRESSURE: 116 MMHG | TEMPERATURE: 98.1 F | DIASTOLIC BLOOD PRESSURE: 77 MMHG | HEART RATE: 68 BPM

## 2018-01-16 VITALS — DIASTOLIC BLOOD PRESSURE: 71 MMHG | HEART RATE: 83 BPM | SYSTOLIC BLOOD PRESSURE: 111 MMHG

## 2018-01-16 VITALS — HEART RATE: 76 BPM | SYSTOLIC BLOOD PRESSURE: 136 MMHG | DIASTOLIC BLOOD PRESSURE: 95 MMHG

## 2018-01-16 VITALS — DIASTOLIC BLOOD PRESSURE: 66 MMHG | SYSTOLIC BLOOD PRESSURE: 107 MMHG | HEART RATE: 70 BPM

## 2018-01-16 VITALS — HEART RATE: 72 BPM

## 2018-01-16 VITALS — SYSTOLIC BLOOD PRESSURE: 107 MMHG | HEART RATE: 79 BPM | DIASTOLIC BLOOD PRESSURE: 69 MMHG

## 2018-01-16 VITALS
OXYGEN SATURATION: 98 % | RESPIRATION RATE: 20 BRPM | TEMPERATURE: 98 F | DIASTOLIC BLOOD PRESSURE: 88 MMHG | SYSTOLIC BLOOD PRESSURE: 120 MMHG | HEART RATE: 92 BPM

## 2018-01-16 VITALS — TEMPERATURE: 97.5 F | RESPIRATION RATE: 18 BRPM

## 2018-01-16 VITALS — DIASTOLIC BLOOD PRESSURE: 94 MMHG | HEART RATE: 74 BPM | SYSTOLIC BLOOD PRESSURE: 135 MMHG

## 2018-01-16 VITALS — SYSTOLIC BLOOD PRESSURE: 126 MMHG | HEART RATE: 69 BPM | DIASTOLIC BLOOD PRESSURE: 99 MMHG

## 2018-01-16 VITALS — SYSTOLIC BLOOD PRESSURE: 109 MMHG | DIASTOLIC BLOOD PRESSURE: 73 MMHG | HEART RATE: 76 BPM

## 2018-01-16 VITALS — SYSTOLIC BLOOD PRESSURE: 105 MMHG | HEART RATE: 74 BPM | DIASTOLIC BLOOD PRESSURE: 70 MMHG

## 2018-01-16 VITALS — DIASTOLIC BLOOD PRESSURE: 96 MMHG | SYSTOLIC BLOOD PRESSURE: 133 MMHG | HEART RATE: 69 BPM

## 2018-01-16 VITALS — SYSTOLIC BLOOD PRESSURE: 130 MMHG | DIASTOLIC BLOOD PRESSURE: 91 MMHG | HEART RATE: 88 BPM

## 2018-01-16 VITALS — HEART RATE: 67 BPM

## 2018-01-16 VITALS — HEART RATE: 76 BPM | DIASTOLIC BLOOD PRESSURE: 84 MMHG | SYSTOLIC BLOOD PRESSURE: 125 MMHG

## 2018-01-16 VITALS — HEART RATE: 110 BPM | SYSTOLIC BLOOD PRESSURE: 135 MMHG | DIASTOLIC BLOOD PRESSURE: 114 MMHG

## 2018-01-16 VITALS — HEART RATE: 70 BPM

## 2018-01-16 VITALS — RESPIRATION RATE: 18 BRPM | TEMPERATURE: 97.4 F

## 2018-01-16 VITALS — RESPIRATION RATE: 16 BRPM

## 2018-01-16 VITALS — SYSTOLIC BLOOD PRESSURE: 127 MMHG | DIASTOLIC BLOOD PRESSURE: 91 MMHG

## 2018-01-16 VITALS — SYSTOLIC BLOOD PRESSURE: 93 MMHG | DIASTOLIC BLOOD PRESSURE: 64 MMHG | HEART RATE: 73 BPM

## 2018-01-16 VITALS — SYSTOLIC BLOOD PRESSURE: 104 MMHG | HEART RATE: 77 BPM | DIASTOLIC BLOOD PRESSURE: 62 MMHG

## 2018-01-16 VITALS — DIASTOLIC BLOOD PRESSURE: 75 MMHG | SYSTOLIC BLOOD PRESSURE: 107 MMHG | HEART RATE: 81 BPM

## 2018-01-16 VITALS — HEART RATE: 78 BPM

## 2018-01-16 VITALS — DIASTOLIC BLOOD PRESSURE: 79 MMHG | SYSTOLIC BLOOD PRESSURE: 118 MMHG | HEART RATE: 63 BPM

## 2018-01-16 VITALS — HEART RATE: 90 BPM

## 2018-01-16 VITALS
HEART RATE: 85 BPM | RESPIRATION RATE: 16 BRPM | TEMPERATURE: 98.5 F | SYSTOLIC BLOOD PRESSURE: 114 MMHG | DIASTOLIC BLOOD PRESSURE: 83 MMHG

## 2018-01-16 VITALS — SYSTOLIC BLOOD PRESSURE: 115 MMHG | DIASTOLIC BLOOD PRESSURE: 72 MMHG | HEART RATE: 65 BPM

## 2018-01-16 VITALS — HEART RATE: 67 BPM | DIASTOLIC BLOOD PRESSURE: 75 MMHG | SYSTOLIC BLOOD PRESSURE: 124 MMHG

## 2018-01-16 VITALS — RESPIRATION RATE: 21 BRPM | OXYGEN SATURATION: 100 % | HEART RATE: 82 BPM

## 2018-01-16 VITALS — HEART RATE: 73 BPM | SYSTOLIC BLOOD PRESSURE: 112 MMHG | DIASTOLIC BLOOD PRESSURE: 62 MMHG

## 2018-01-16 VITALS — HEART RATE: 100 BPM

## 2018-01-16 VITALS — TEMPERATURE: 98.3 F | RESPIRATION RATE: 18 BRPM

## 2018-01-16 VITALS — DIASTOLIC BLOOD PRESSURE: 83 MMHG | SYSTOLIC BLOOD PRESSURE: 118 MMHG | HEART RATE: 67 BPM

## 2018-01-16 VITALS — HEART RATE: 66 BPM | DIASTOLIC BLOOD PRESSURE: 91 MMHG | SYSTOLIC BLOOD PRESSURE: 130 MMHG

## 2018-01-16 VITALS — HEART RATE: 80 BPM | SYSTOLIC BLOOD PRESSURE: 109 MMHG | DIASTOLIC BLOOD PRESSURE: 65 MMHG

## 2018-01-16 VITALS — DIASTOLIC BLOOD PRESSURE: 76 MMHG | HEART RATE: 75 BPM | SYSTOLIC BLOOD PRESSURE: 130 MMHG

## 2018-01-16 VITALS — HEART RATE: 84 BPM | SYSTOLIC BLOOD PRESSURE: 117 MMHG | DIASTOLIC BLOOD PRESSURE: 83 MMHG

## 2018-01-16 VITALS — SYSTOLIC BLOOD PRESSURE: 136 MMHG | HEART RATE: 78 BPM | DIASTOLIC BLOOD PRESSURE: 95 MMHG

## 2018-01-16 VITALS — HEART RATE: 92 BPM | SYSTOLIC BLOOD PRESSURE: 135 MMHG | DIASTOLIC BLOOD PRESSURE: 81 MMHG

## 2018-01-16 VITALS — SYSTOLIC BLOOD PRESSURE: 130 MMHG | HEART RATE: 65 BPM | DIASTOLIC BLOOD PRESSURE: 94 MMHG

## 2018-01-16 VITALS — HEART RATE: 73 BPM

## 2018-01-16 VITALS — DIASTOLIC BLOOD PRESSURE: 96 MMHG | SYSTOLIC BLOOD PRESSURE: 134 MMHG | HEART RATE: 84 BPM

## 2018-01-16 VITALS — HEART RATE: 69 BPM

## 2018-01-16 VITALS — HEART RATE: 80 BPM | SYSTOLIC BLOOD PRESSURE: 111 MMHG | DIASTOLIC BLOOD PRESSURE: 74 MMHG

## 2018-01-16 VITALS — DIASTOLIC BLOOD PRESSURE: 74 MMHG | SYSTOLIC BLOOD PRESSURE: 117 MMHG | HEART RATE: 71 BPM

## 2018-01-16 VITALS — HEART RATE: 74 BPM

## 2018-01-16 VITALS — SYSTOLIC BLOOD PRESSURE: 116 MMHG | DIASTOLIC BLOOD PRESSURE: 71 MMHG | HEART RATE: 77 BPM

## 2018-01-16 VITALS — SYSTOLIC BLOOD PRESSURE: 122 MMHG | HEART RATE: 77 BPM | DIASTOLIC BLOOD PRESSURE: 75 MMHG

## 2018-01-16 VITALS
TEMPERATURE: 98.9 F | HEART RATE: 70 BPM | DIASTOLIC BLOOD PRESSURE: 81 MMHG | RESPIRATION RATE: 18 BRPM | SYSTOLIC BLOOD PRESSURE: 122 MMHG

## 2018-01-16 VITALS — DIASTOLIC BLOOD PRESSURE: 64 MMHG | SYSTOLIC BLOOD PRESSURE: 109 MMHG | HEART RATE: 70 BPM

## 2018-01-16 VITALS — RESPIRATION RATE: 18 BRPM | TEMPERATURE: 98.2 F

## 2018-01-16 VITALS — HEART RATE: 70 BPM | DIASTOLIC BLOOD PRESSURE: 98 MMHG | SYSTOLIC BLOOD PRESSURE: 135 MMHG

## 2018-01-16 VITALS — DIASTOLIC BLOOD PRESSURE: 87 MMHG | HEART RATE: 78 BPM | SYSTOLIC BLOOD PRESSURE: 122 MMHG

## 2018-01-16 VITALS — TEMPERATURE: 98.6 F

## 2018-01-16 VITALS — TEMPERATURE: 98.2 F | RESPIRATION RATE: 18 BRPM

## 2018-01-16 VITALS — DIASTOLIC BLOOD PRESSURE: 78 MMHG | SYSTOLIC BLOOD PRESSURE: 122 MMHG | HEART RATE: 66 BPM

## 2018-01-16 VITALS — HEART RATE: 71 BPM | SYSTOLIC BLOOD PRESSURE: 125 MMHG | DIASTOLIC BLOOD PRESSURE: 92 MMHG

## 2018-01-16 VITALS — SYSTOLIC BLOOD PRESSURE: 104 MMHG | HEART RATE: 74 BPM | DIASTOLIC BLOOD PRESSURE: 73 MMHG

## 2018-01-16 VITALS — HEART RATE: 91 BPM | DIASTOLIC BLOOD PRESSURE: 82 MMHG | SYSTOLIC BLOOD PRESSURE: 127 MMHG

## 2018-01-16 VITALS — DIASTOLIC BLOOD PRESSURE: 93 MMHG | HEART RATE: 72 BPM | SYSTOLIC BLOOD PRESSURE: 143 MMHG

## 2018-01-16 VITALS — SYSTOLIC BLOOD PRESSURE: 139 MMHG | HEART RATE: 69 BPM | DIASTOLIC BLOOD PRESSURE: 108 MMHG

## 2018-01-16 VITALS — HEART RATE: 71 BPM

## 2018-01-16 RX ADMIN — IBUPROFEN PRN MG: 800 TABLET, FILM COATED ORAL at 23:25

## 2018-01-16 RX ADMIN — IBUPROFEN PRN MG: 800 TABLET, FILM COATED ORAL at 23:27

## 2018-01-16 RX ADMIN — OXYTOCIN SCH MLS/HR: 10 INJECTION, SOLUTION INTRAMUSCULAR; INTRAVENOUS at 17:58

## 2018-01-16 RX ADMIN — STANDARDIZED SENNA CONCENTRATE AND DOCUSATE SODIUM PRN TAB: 8.6; 5 TABLET, FILM COATED ORAL at 23:25

## 2018-01-16 RX ADMIN — PENICILLIN G POTASSIUM SCH MLS/HR: 5000000 INJECTION, POWDER, FOR SOLUTION INTRAMUSCULAR; INTRAVENOUS at 19:00

## 2018-01-16 RX ADMIN — OXYTOCIN SCH MLS/HR: 10 INJECTION, SOLUTION INTRAMUSCULAR; INTRAVENOUS at 23:25

## 2018-01-16 RX ADMIN — PENICILLIN G POTASSIUM SCH MLS/HR: 5000000 INJECTION, POWDER, FOR SOLUTION INTRAMUSCULAR; INTRAVENOUS at 13:22

## 2018-01-16 RX ADMIN — PENICILLIN G POTASSIUM SCH MLS/HR: 5000000 INJECTION, POWDER, FOR SOLUTION INTRAMUSCULAR; INTRAVENOUS at 17:16

## 2018-01-16 RX ADMIN — SODIUM CITRATE AND CITRIC ACID MONOHYDRATE SCH ML: 500; 334 SOLUTION ORAL at 20:16

## 2018-01-16 RX ADMIN — SODIUM CITRATE AND CITRIC ACID MONOHYDRATE SCH ML: 500; 334 SOLUTION ORAL at 17:57

## 2018-01-16 RX ADMIN — STANDARDIZED SENNA CONCENTRATE AND DOCUSATE SODIUM PRN TAB: 8.6; 5 TABLET, FILM COATED ORAL at 23:26

## 2018-01-16 NOTE — MP
cc:

MOHINI WILDER MD

****

 

 

DATE OF SURGERY

18

 

PREOPERATIVE DIAGNOSIS

1.  40 weeks gestation.

2.  Direct occiput posterior presentation.

3.  Failure of descent in labor.

4.  Positive group B strep.

 

POSTOPERATIVE DIAGNOSIS

1.  40 weeks gestation.

2.  Direct occiput posterior presentation.

3.  Failure of descent in labor.

4.  Positive group B strep.

 

PROCEDURE

Primary low transverse  section without extension

 

ESTIMATED BLOOD LOSS

600 mL

 

ANESTHESIA

Epidural

 

DRAINS

Mcrae to gravity.

 

COMPLICATIONS

None

 

PATHOLOGY

None

 

FINDINGS

1.  Normal uterus, tubes and ovaries.

2.  Clear amniotic fluid.

3.  Infant male vertex presentation in occiput posterior.  Apgars were 8 and 9.

Weight was 3485 grams, 7 pounds 11 ounces.

 

DESCRIPTION OF PROCEDURE

The patient taken back to the operating room,  draped in usual  A sterile

fashion, placed in the dorsal supine position with a wedge to her left side.

After adequate anesthetic a Pfannenstiel   incision was made in the skin, taken

down to the fascia and the fascia was nicked in the midline and extended

bilaterally, taken down to the rectus muscles. Muscles were divided in the

midline. Anterior peritoneum was entered, extended superiorly inferiorly taking

care to avoid traumatization of the bowel and the bladder. Transverse

hysterotomy incision was made bluntly and extended bilaterally.   The infant

was delivered to the operative field.  Nuchal cord x1 was reduced, the rest of

the body was delivered and handed off to the resuscitation team.  A 45-second

cord clamping delay was administered.  Placenta was delivered intact

spontaneously and the endometrial cavity was curetted with a moist laparotomy

sponge.  Hysterotomy incision was repaired using a running locking #1 chromic

suture with good hemostasis at closure.  Gutters were rendered free of all

blood and clot material.  The peritoneum was closed with a running suture of

3-0 chromic. A  #1 PDS was placed into the fascia and a subcuticular suture

using 3-0 Monocryl was placed in the skin.  The patient tolerated the procedure

well.  She has taken back to recovery room in good condition.

 

                              _________________________________

                              Mohini Wilder MD

 

 

 

/SA

D:  2018/7:28 PM

T:  2018/9:19 PM

Visit #:  [id number]

Job #:  [id number]

## 2018-01-16 NOTE — PD.OB.DELI
Procedure Note


 Section Procedure


Pre Op Diagnosis:  


(1) 40 weeks gestation of pregnancy


(2) Direct occiput posterior presentation of fetus


(3) Failure of fetal descent in labor, delivered, current hospitalization


(4) Mother positive for group B Streptococcus colonization


Post Op Diagnosis:  


Performed by


Marilu Urrutia


Procedure:  Primary Low Transverse  Sec


Indication for delivery:  Other (failure to descend with OP presentation)


Informed consent obtained:  For anesthesia, For procedure


Confirmed correct:  Time-out taken


Anesthesia:  Epidural


Medication prior to procedure:  As documented in eMAR, Antibiotics, IV


Urinary catheter:  To dependent drainage, ml urine output (200 cc)


Sterile preparation:  Duraprep


Position:  Supine with wedge to left side





Operative Features


Skin Incision:  Pfannenstiel


Uterine Incision:  Low transverse w/knife / blunt ext


Membranes Ruptured:  Previously, Appearance of fluid (clear fluid)


Presentation:  Occiput posterior


Delivery date:  2018


Delivery time:  19:05


Delivery of infant:  Uneventful, Umbilical cord (nuchal cord 1)


Infant:  Male


One Minute APGAR:  8


Five Minute APGAR:  9


Birth Weight:  3485 g


Status of infant:  Viable


Placenta delivered:  Intact


Estimated blood loss:  600 cc


Procedure tolerated:  Well


Maternal Condition:  Stable


Fetal Condition:  Stable











Marilu Urrutia MD 2018 19:30

## 2018-01-17 VITALS
HEART RATE: 70 BPM | TEMPERATURE: 98.3 F | SYSTOLIC BLOOD PRESSURE: 122 MMHG | DIASTOLIC BLOOD PRESSURE: 70 MMHG | RESPIRATION RATE: 18 BRPM

## 2018-01-17 VITALS
RESPIRATION RATE: 18 BRPM | HEART RATE: 72 BPM | SYSTOLIC BLOOD PRESSURE: 120 MMHG | TEMPERATURE: 98.3 F | DIASTOLIC BLOOD PRESSURE: 70 MMHG

## 2018-01-17 VITALS
RESPIRATION RATE: 16 BRPM | TEMPERATURE: 98 F | SYSTOLIC BLOOD PRESSURE: 110 MMHG | HEART RATE: 68 BPM | DIASTOLIC BLOOD PRESSURE: 84 MMHG

## 2018-01-17 VITALS
SYSTOLIC BLOOD PRESSURE: 112 MMHG | TEMPERATURE: 97.7 F | OXYGEN SATURATION: 97 % | HEART RATE: 70 BPM | DIASTOLIC BLOOD PRESSURE: 69 MMHG | RESPIRATION RATE: 20 BRPM

## 2018-01-17 VITALS
RESPIRATION RATE: 18 BRPM | TEMPERATURE: 98.7 F | HEART RATE: 87 BPM | DIASTOLIC BLOOD PRESSURE: 77 MMHG | SYSTOLIC BLOOD PRESSURE: 108 MMHG

## 2018-01-17 VITALS
RESPIRATION RATE: 18 BRPM | SYSTOLIC BLOOD PRESSURE: 117 MMHG | HEART RATE: 82 BPM | TEMPERATURE: 98.4 F | DIASTOLIC BLOOD PRESSURE: 75 MMHG

## 2018-01-17 LAB
BASOPHILS # BLD AUTO: 0 TH/MM3 (ref 0–0.2)
BASOPHILS NFR BLD: 0.2 % (ref 0–2)
EOSINOPHIL # BLD: 0 TH/MM3 (ref 0–0.4)
EOSINOPHIL NFR BLD: 0.1 % (ref 0–4)
ERYTHROCYTE [DISTWIDTH] IN BLOOD BY AUTOMATED COUNT: 19.1 % (ref 11.6–17.2)
HCT VFR BLD CALC: 27.6 % (ref 35–46)
HGB BLD-MCNC: 9 GM/DL (ref 11.6–15.3)
LYMPHOCYTES # BLD AUTO: 1.3 TH/MM3 (ref 1–4.8)
LYMPHOCYTES NFR BLD AUTO: 8 % (ref 9–44)
MCH RBC QN AUTO: 24.8 PG (ref 27–34)
MCHC RBC AUTO-ENTMCNC: 32.8 % (ref 32–36)
MCV RBC AUTO: 75.7 FL (ref 80–100)
MONOCYTE #: 1.2 TH/MM3 (ref 0–0.9)
MONOCYTES NFR BLD: 7.7 % (ref 0–8)
NEUTROPHILS # BLD AUTO: 13.5 TH/MM3 (ref 1.8–7.7)
NEUTROPHILS NFR BLD AUTO: 84 % (ref 16–70)
PLATELET # BLD: 239 TH/MM3 (ref 150–450)
PMV BLD AUTO: 8.3 FL (ref 7–11)
RBC # BLD AUTO: 3.65 MIL/MM3 (ref 4–5.3)
WBC # BLD AUTO: 16.1 TH/MM3 (ref 4–11)

## 2018-01-17 RX ADMIN — Medication SCH ML: at 21:23

## 2018-01-17 RX ADMIN — IBUPROFEN PRN MG: 800 TABLET, FILM COATED ORAL at 10:24

## 2018-01-17 RX ADMIN — OXYCODONE HYDROCHLORIDE AND ACETAMINOPHEN PRN TAB: 5; 325 TABLET ORAL at 10:24

## 2018-01-17 RX ADMIN — Medication SCH ML: at 21:12

## 2018-01-17 RX ADMIN — IBUPROFEN PRN MG: 800 TABLET, FILM COATED ORAL at 21:09

## 2018-01-17 RX ADMIN — OXYTOCIN SCH MLS/HR: 10 INJECTION, SOLUTION INTRAMUSCULAR; INTRAVENOUS at 21:12

## 2018-01-17 RX ADMIN — OXYCODONE HYDROCHLORIDE AND ACETAMINOPHEN PRN TAB: 5; 325 TABLET ORAL at 21:09

## 2018-01-17 RX ADMIN — OXYCODONE HYDROCHLORIDE AND ACETAMINOPHEN PRN TAB: 5; 325 TABLET ORAL at 15:02

## 2018-01-18 VITALS
SYSTOLIC BLOOD PRESSURE: 118 MMHG | DIASTOLIC BLOOD PRESSURE: 88 MMHG | TEMPERATURE: 98.5 F | HEART RATE: 67 BPM | RESPIRATION RATE: 18 BRPM

## 2018-01-18 VITALS — DIASTOLIC BLOOD PRESSURE: 85 MMHG | HEART RATE: 69 BPM | SYSTOLIC BLOOD PRESSURE: 119 MMHG

## 2018-01-18 VITALS
DIASTOLIC BLOOD PRESSURE: 80 MMHG | TEMPERATURE: 97.9 F | HEART RATE: 66 BPM | RESPIRATION RATE: 18 BRPM | SYSTOLIC BLOOD PRESSURE: 120 MMHG

## 2018-01-18 RX ADMIN — OXYCODONE HYDROCHLORIDE AND ACETAMINOPHEN PRN TAB: 5; 325 TABLET ORAL at 05:13

## 2018-01-18 RX ADMIN — OXYCODONE HYDROCHLORIDE AND ACETAMINOPHEN PRN TAB: 5; 325 TABLET ORAL at 13:52

## 2018-01-18 RX ADMIN — FERROUS SULFATE TAB 325 MG (65 MG ELEMENTAL FE) SCH MG: 325 (65 FE) TAB at 08:44

## 2018-01-18 RX ADMIN — FERROUS SULFATE TAB 325 MG (65 MG ELEMENTAL FE) SCH MG: 325 (65 FE) TAB at 21:30

## 2018-01-18 RX ADMIN — OXYCODONE HYDROCHLORIDE AND ACETAMINOPHEN PRN TAB: 5; 325 TABLET ORAL at 19:41

## 2018-01-18 RX ADMIN — STANDARDIZED SENNA CONCENTRATE AND DOCUSATE SODIUM PRN TAB: 8.6; 5 TABLET, FILM COATED ORAL at 13:58

## 2018-01-18 RX ADMIN — IBUPROFEN PRN MG: 800 TABLET, FILM COATED ORAL at 05:13

## 2018-01-18 RX ADMIN — IBUPROFEN PRN MG: 800 TABLET, FILM COATED ORAL at 13:52

## 2018-01-19 VITALS
OXYGEN SATURATION: 96 % | TEMPERATURE: 97.3 F | HEART RATE: 66 BPM | RESPIRATION RATE: 18 BRPM | DIASTOLIC BLOOD PRESSURE: 85 MMHG | SYSTOLIC BLOOD PRESSURE: 136 MMHG

## 2018-01-19 VITALS
HEART RATE: 64 BPM | DIASTOLIC BLOOD PRESSURE: 84 MMHG | TEMPERATURE: 98.8 F | SYSTOLIC BLOOD PRESSURE: 116 MMHG | RESPIRATION RATE: 18 BRPM

## 2018-01-19 RX ADMIN — OXYCODONE HYDROCHLORIDE AND ACETAMINOPHEN PRN TAB: 5; 325 TABLET ORAL at 00:04

## 2018-01-19 RX ADMIN — OXYCODONE HYDROCHLORIDE AND ACETAMINOPHEN PRN TAB: 5; 325 TABLET ORAL at 04:58

## 2018-01-19 RX ADMIN — IBUPROFEN PRN MG: 800 TABLET, FILM COATED ORAL at 00:04

## 2018-01-19 NOTE — HHI.DCPOC
Discharge Care Plan


Diagnosis:  


(1) Postpartum care following  delivery


Report Symptoms to Your Doctor


-Temperature above 100.5 degrees


-Redness, of incision or excessive or foul smelling drainage


-Unusual pain or calf pain


-Increased vaginal bleeding


-Painful or difficulty urinating


-Feelings of extreme sadness or anxiety after 2 weeks


Goals to Promote Your Health


* To prevent worsening of your condition and complications


* To maintain your health at the optimal level


Directions to Meet Your Goals


*** Take your medications as prescribed


*** Follow your dietary instruction


*** Follow activity as directed


*** Ensure plenty of rest for recovery


*** Drink fluids for hydration








*** Keep your appointments as scheduled


*** Take your immunizations and boosters as scheduled


*** If your symptoms worsen call your PCP, if no PCP go to Urgent Care Center 

or Emergency Room***


*** Smoking is Dangerous to Your Health. Avoid second hand smoke***


***Call the 24-hour crisis hotline for domestic abuse at 1-904.451.3439***











Luis Miguel Reyes MD, R3 2018 09:52

## 2018-04-05 ENCOUNTER — HOSPITAL ENCOUNTER (EMERGENCY)
Dept: HOSPITAL 17 - NEPK | Age: 23
Discharge: HOME | End: 2018-04-05
Payer: MEDICAID

## 2018-04-05 VITALS
SYSTOLIC BLOOD PRESSURE: 124 MMHG | HEART RATE: 75 BPM | RESPIRATION RATE: 16 BRPM | OXYGEN SATURATION: 100 % | DIASTOLIC BLOOD PRESSURE: 76 MMHG | TEMPERATURE: 98.3 F

## 2018-04-05 VITALS — RESPIRATION RATE: 20 BRPM

## 2018-04-05 DIAGNOSIS — N76.0: ICD-10-CM

## 2018-04-05 DIAGNOSIS — F17.210: ICD-10-CM

## 2018-04-05 DIAGNOSIS — M54.5: Primary | ICD-10-CM

## 2018-04-05 DIAGNOSIS — N72: ICD-10-CM

## 2018-04-05 LAB
COLOR UR: (no result)
GLUCOSE UR STRIP-MCNC: (no result) MG/DL
HGB UR QL STRIP: (no result)
KETONES UR STRIP-MCNC: (no result) MG/DL
MUCOUS THREADS #/AREA URNS LPF: (no result) /LPF
NITRITE UR QL STRIP: (no result)
SP GR UR STRIP: 1.02 (ref 1–1.03)
SQUAMOUS #/AREA URNS HPF: 2 /HPF (ref 0–5)
URINE LEUKOCYTE ESTERASE: (no result)

## 2018-04-05 PROCEDURE — 87591 N.GONORRHOEAE DNA AMP PROB: CPT

## 2018-04-05 PROCEDURE — 84703 CHORIONIC GONADOTROPIN ASSAY: CPT

## 2018-04-05 PROCEDURE — 99283 EMERGENCY DEPT VISIT LOW MDM: CPT

## 2018-04-05 PROCEDURE — 87210 SMEAR WET MOUNT SALINE/INK: CPT

## 2018-04-05 PROCEDURE — 81001 URINALYSIS AUTO W/SCOPE: CPT

## 2018-04-05 PROCEDURE — 87491 CHLMYD TRACH DNA AMP PROBE: CPT

## 2018-04-05 PROCEDURE — 96372 THER/PROPH/DIAG INJ SC/IM: CPT

## 2018-04-05 NOTE — PD
HPI


Chief Complaint:  Back/ Neck Pain or Injury


Time Seen by Provider:  10:07


Travel History


International Travel<30 days:  No


Contact w/Intl Traveler<30days:  No


Traveled to known affect area:  No





History of Present Illness


HPI


22-year-old female presents to the emergency department complaining of low back 

pain and abnormal vaginal discharge 5 days.  Denies injury or strain.  Cannot 

reproduce the pain when she pushes on her lower back.  Says the pain in her 

lower back feels deep inside.  Pain radiates around to her flank area.  Reports 

pain in her lower abdomen/pelvic region.  Denies dysuria, urinary frequency, 

hematuria.  Denies abdominal pain, nausea, vomiting.  Reports abnormal vaginal 

discharge with foul-smelling odor.  Denies vaginal itching or lesions.  Denies 

encopresis, incontinence, saddle anesthesias.  Denies IV drug use or cancer.  

Denies paresthesias, loss of sensation, decreased range of motion, decreased 

strength to bilateral lower extremities.  Denies history of kidney stones.  

Rates pain 6/10.  Worse with movement.  Better with laying down.  Has not taken 

any medications or try any treatments to alleviate her symptoms.  No known 

relieving factors.  Questionable exposure to an STD.  Takes oral contraception.

  Last menstrual period 2 weeks ago.  Primary care provider is Dr. Benedict.  No 

known allergies.  Denies significant past medical history.  No other modifying 

factors or associated signs and symptoms.





PFSH


Past Medical History


Autoimmune Disease:  No


Cardiovascular Problems:  No


Diminished Hearing:  No


Gastrointestinal Disorders:  No


Genitourinary:  Yes (STD)


Musculoskeletal:  No


Neurologic:  No


Psychiatric:  No


Respiratory:  No


Immunizations Current:  Yes


Sickle Cell Disease:  No


:  0





Past Surgical History


Oral Surgery:  Yes


Other Surgery:  No





Social History


Alcohol Use:  No


Tobacco Use:  Yes (1 cigarette per day)


Substance Use:  No





Allergies-Medications


(Allergen,Severity, Reaction):  


Coded Allergies:  


     No Known Allergies (Verified  Allergy, Unknown, 17)


Reported Meds & Prescriptions





Reported Meds & Active Scripts


Active


Flagyl (Metronidazole) 500 Mg Tab 500 Mg PO BID


Gnp Senna Plus 8.6-50 mg (Sennosides-Docusate Sodium) 8.6 Mg-50 Mg Tab 2 Tab PO 

Q12H PRN


Oxycodone-Acetaminophen 5-325 (Oxycodone HCl/Acetaminophen) 5 Mg-325 Mg Tablet 

1 Tab PO Q4H PRN


Ibuprofen 800 Mg Tab 800 Mg PO Q8H PRN


Ferosul (Ferrous Sulfate) 325 Mg (65 Mg Iron) Tablet 325 Mg PO BID


Ibuprofen 600 Mg Tab 600 Mg PO Q6H PRN








Review of Systems


Except as stated in HPI:  all other systems reviewed are Neg





Physical Exam


Narrative


GENERAL: Well-nourished, well-developed black female patient, in no acute 

distress; afebrile, nontoxic-appearing


SKIN: Warm and dry.


HEAD: Atraumatic. Normocephalic. 


EYES: Pupils equal and round. No scleral icterus. No injection or drainage.


ENT: Mucosa pink and moist.  Airway patent.


NECK: Trachea midline.


CARDIOVASCULAR: Regular rate.  


RESPIRATORY: No accessory muscle use.  


GASTROINTESTINAL:  Abdomen soft, non-tender, nondistended. Positive bowel 

sounds.  No hepato-splenomegaly, or  


palpable masses. No guarding.  Tenderness on palpation of the bladder; 

nondistended.


PELVIC:  Exam done in the presence of a nurse. Speculum exam reveals edematous 

and erythematous cervix with light yellow, mucopurulent, foul-smelling 

discharge.  Bimanual exam reveals no palpable masses or adnexa tenderness, no 

uterine tenderness.  No cervical motion tenderness.  


MUSCULOSKELETAL:  Bilateral lower extremities supple and non-tense with 2+ 

pedal pulses and sensory intact; with full range of motion and 5/5 strength.  2

+ DTRs bilaterally.   Active dorsiflexion and extension of bilateral feet.  

Bilateral straight leg raise is negative for low back pain.  Ambulatory in room 

with normal gait.  Sitting up in bed at 90.  No obvious deformities. No 

clubbing.  No cyanosis.  No edema. 


BACK: No CVA tenderness.  No midline point tenderness on palpation of the 

lumbar spine.  No reproducible tenderness to the lower back on palpation at 

all. No obvious deformities.


NEUROLOGICAL: Awake and alert.  Oriented 3.  No obvious cranial nerve 

deficits.  Motor grossly within normal limits. Normal speech.  Moves all 

extremities.  5/5 strength to all extremities.  Sensory intact.


PSYCHIATRIC: Appropriate mood and affect; insight and judgment normal.





Data


Data


Last Documented VS





Vital Signs








  Date Time  Temp Pulse Resp B/P (MAP) Pulse Ox O2 Delivery O2 Flow Rate FiO2


 


18 11:52   20     


 


18 09:21 98.3 75  124/76 (92) 100   








Orders





 Orders


Urinalysis - C+S If Indicated (18 09:23)


Gc And Chlamydia Pcr (18 10:15)


Wet Prep Profile (18 10:15)


Ed Urine Pregnancytest Poc (18 10:15)


Ketorolac Inj (Toradol Inj) (18 10:45)


Ceftriaxone Inj (Rocephin Inj) (18 11:30)


Azithromycin (Zithromax) (18 11:30)


Ondansetron  Odt (Zofran  Odt) (18 11:30)


Lidocaine Pf 1% Inj (Xylocaine-Mpf 1% In (18 11:45)





Labs





Laboratory Tests








Test


  18


10:00 18


11:20


 


Urine Color LIGHT-YELLOW  


 


Urine Turbidity CLEAR  


 


Urine pH 6.0  


 


Urine Specific Gravity 1.021  


 


Urine Protein NEG mg/dL  


 


Urine Glucose (UA) NEG mg/dL  


 


Urine Ketones NEG mg/dL  


 


Urine Occult Blood NEG  


 


Urine Nitrite NEG  


 


Urine Bilirubin NEG  


 


Urine Urobilinogen


  LESS THAN 2.0


MG/DL 


 


 


Urine Leukocyte Esterase NEG  


 


Urine RBC


  LESS THAN 1


/hpf 


 


 


Urine Squamous Epithelial


Cells 2 /hpf 


  


 


 


Urine Mucus FEW /lpf  


 


Microscopic Urinalysis Comment


  CULT NOT


INDICATED 


 


 


Clue Cells (Wet Prep)  PRESENT 


 


Vaginal Trichomonas (Wet Prep)  NONE SEEN 


 


Vaginal Yeast (Wet Prep)  NONE SEEN 











MDM


Medical Decision Making


Medical Screen Exam Complete:  Yes


Emergency Medical Condition:  Yes


Medical Record Reviewed:  Yes


Differential Diagnosis


Chlamydia, gonorrhea, PID, trichomonas, cystitis, UTI, pyonephritis


Narrative Course


22-year-old female with nonreproducible low back pain and abnormal vaginal 

discharge with foul odor.  Denies injury.  Denies encopresis, incontinence, 

saddle anesthesias.  Denies IV drug use, cancer.  Neuro exam is unremarkable.  

Wet prep, chlamydia, gonorrhea, urinalysis, UPT ordered.


1117: Pelvic exam concludes cervicitis with foul-smelling light yellow vaginal 

discharge.  Patient empirically treated with azithromycin and Rocephin in the 

ER.


1118: Urinalysis without signs of infection.


1157: Positive for clue cells/bacterial vaginosis.  Negative for trichomonas 

and vaginal yeast.  Chlamydia and gonorrhea pending.  Flagyl prescribed for 

home.  Instructed patient to follow-up with GYN.  Instructed patient to follow 

up with primary care provider.  Patient verbalizes understanding and agreement 

with treatment plan.  Patient is medically cleared and stable for discharge.  

Discussed reasons to return to the emergency department.  Patient agrees with 

treatment plan.  The patients vital signs are stable and the patient is stable 

for outpatient follow-up and treatment.  Patient discharged home, stable and in 

no acute distress.





Diagnosis





 Primary Impression:  


 Low back pain


 Qualified Codes:  M54.5 - Low back pain


 Additional Impressions:  


 Cervicitis


 Bacterial vaginosis


Referrals:  


[facility]





Gynecologist





[facility]





Primary Care Physician


Patient Instructions:  Acute Low Back Pain (ED), Bacterial Vaginosis (ED), 

Cervicitis (ED), General Instructions





***Additional Instructions:  


Antibiotics as prescribed; do not drink alcohol while taking Flagyl


Avoid wearing tight fitted pants


Wear cotton underwear only


Tylenol or ibuprofen as directed and as needed for pain


Robaxin as prescribed and as needed for muscle spasms


Heating pad and/or ice to affected area to reduce pain


Avoid aggravating activities; increase activity as tolerated


Follow-up with primary care provider


Return to emergency department immediately with worsening of symptoms


***Med/Other Pt SpecificInfo:  Prescription(s) given


Scripts


Metronidazole (Flagyl) 500 Mg Tab


500 MG PO BID for Infection for 7 Days, #14 TAB 0 Refills


   Prov: Sigrid Mcneal         18 


Methocarbamol (Robaxin) 500 Mg Tab


500 MG PO QID Y for MUSCLE SPASM, #30 TAB 0 Refills


   Prov: Sigrid Mcneal ARNP         18 


Ibuprofen (Ibuprofen) 800 Mg Tab


800 MG PO Q6HR Y for PAIN, #30 TAB 0 Refills


   Prov: Sigrid Mcneal         18


Disposition:  01 DISCHARGE HOME


Condition:  Stable











Sigrid Mcneal 2018 10:21

## 2025-06-23 NOTE — HHI.OB
Subjective


Post Operative Day:  1


Remarks


Ms. Ingram is a 23 yo  who is POD 1 from CS (failure to progress)  at 

1905. 


Patient has been afebrile with stable vital signs overnight. Patient reports 

that she is doing well at this time; she reports that her abdominal pain is 

controlled. Patient has had light vaginal bleeding. Patient has been ambulating 

well. No dysuria. Patient passing gas; no bowel movement. Patient is bottle 

feeding. No chest pain, shortness of breath, or leg swelling.





Objective


Vitals/I&O





Vital Signs








  Date Time  Temp Pulse Resp B/P (MAP) Pulse Ox O2 Delivery O2 Flow Rate FiO2


 


18 03:30 98.3  18     


 


18 03:30  70  122/70 (87)    


 


18 01:10   16     


 


18 00:30 98.3 72 18 120/70 (87)    


 


18 21:30 98.9 70 18 122/81 (95)    


 


18 20:25 98.6       


 


18 20:22   16  100   


 


18 20:22  82 21     


 


18 20:21    127/91 (103)    


 


18 20:08  83 21  100   


 


18 20:08    129/67 (87)    


 


18 19:55  91  129/77 (94)    


 


18 19:55   22  100   


 


18 19:40 98.0    98   


 


18 19:40  92 20 120/88 (99)    


 


18 18:20  84  134/96 (109)    


 


18 18:15  81      


 


18 18:15  76  136/95 (109)    


 


18 18:13  72  143/93 (110)    


 


18 17:30  73  112/62 (79)    


 


18 17:13 98.5  20     


 


18 17:01  73  93/64 (74)    


 


18 16:31  76  125/84 (98)    


 


18 16:01  69  139/108 (118)    


 


18 15:45 97.5  18     


 


18 15:31  74  124/76 (92)    


 


18 15:00  73  123/79 (94)    


 


18 14:31  70  109/64 (79)    


 


18 14:01  91  127/82 (97)    


 


18 13:52   18     


 


18 13:45 98.3  18     


 


18 13:30  69  119/102 (108)    


 


18 13:00  74  135/94 (108)    


 


18 12:45 98.2  18     


 


18 12:30  110  135/114 (121)    


 


18 12:01  63  127/72 (90)    


 


18 11:45 98.2  18     


 


18 11:39  69  133/96 (108)    


 


18 11:30  70  135/98 (110)    


 


18 11:15  66  122/78 (93)    


 


18 11:00  70  133/83 (100)    


 


18 10:25  67      


 


18 10:20  67      


 


18 10:16  65  130/94 (106)    


 


18 10:01  75  130/76 (94)    


 


18 10:00  76      


 


18 09:50  71      


 


18 09:45  70      


 


18 09:45   16     


 


18 09:45  71  124/87 (99)    


 


18 09:40  73      


 


18 09:35  74      


 


18 09:31  80  111/74 (86)    


 


18 09:10  67      


 


18 09:05  78      


 


18 09:03  78  136/95 (109)    


 


18 09:00  70      


 


18 08:58 97.4       


 


18 08:58   18     


 


18 08:55  76      


 


18 08:46  66  130/91 (104)    


 


18 08:45  69      


 


18 08:40  72      


 


18 08:35  67      








Result Diagram:  


18 0436





Objective Remarks


GENERAL: Well-nourished, well-developed patient.


CARDIOVASCULAR: Regular rate and rhythm without murmurs. Normal perfusion


RESPIRATORY: CTAB; normal rate


ABDOMEN/GI: Abdomen soft, non-tender, bowel sounds present. 


   Incision: Clean, dry and intact.


   Fundus: Firm, non-tender at umbilicus.


GENITOURINARY: Light bleeding.


EXTREMITIES: No cyanosis or edema, non-tender, without signs of DVT.


Medications and IVs





Current Medications








 Medications


  (Trade)  Dose


 Ordered  Sig/[place]


 Route  Start Time


 Stop Time Status Last Admin


 


 Lactated Ringer's  1,000 ml @ 


 125 mls/hr  Q8H


 IV  1/15/18 06:52


    18 23:25


 


 


 Lactated Ringer's  1,000 ml @ 


 3,000 mls/hr  Q20M PRN


 IV  1/15/18 06:52


     


 


 


 Sodium Chloride  1,000 ml @ 


 100 mls/hr  Q10H PRN


 IV  1/15/18 07:12


     


 


 


  (Xylocaine 1%


 Inj (50 ml))  0.1 ml  UNSCH X1  PRN


 I-DERMAL  1/15/18 07:00


 18 06:59   


 


 


  (Bicitra Liq)  30 ml  ON  CALL


 PO  1/15/18 07:00


 18 06:59  18 20:16


 


 


  (fentaNYL INJ)  50 mcg  Q1H  PRN


 IV PUSH  1/15/18 07:00


    1/15/18 14:27


 


 


  (fentaNYL INJ)  100 mcg  Q1H  PRN


 IV PUSH  1/15/18 07:00


    1/15/18 17:41


 


 


  (Muri-Lube Oil)  10 ml  UNSCH  PRN


 TOPICAL  1/15/18 07:00


     


 


 


 Cefazolin Sodium/


 Dextrose  50 ml @ 


 100 mls/hr  ON  CALL


 IV  18 17:30


 18 17:29  18 20:16


 


 


 Lactated Ringer's  1,000 ml @ 


 100 mls/hr  Q10H


 IV  18 00:32


 18 20:31   


 


 


 Oxytocin  500 ml @ 


 100 mls/hr  UNSCH X1  PRN


 IV  18 05:45


 18 05:44   


 


 


  (NS Flush)  2 ml  BID


 IV FLUSH  18 21:00


     


 


 


  (NS Flush)  2 ml  UNSCH  PRN


 IV FLUSH  18 19:45


     


 


 


  (Mylicon Chew)  80 mg  QID  PRN


 PO  18 19:45


     


 


 


  (Percocet  5-325


 Mg)  1 tab  Q4H  PRN


 PO  18 19:45


     


 


 


  (Percocet  5-325


 Mg)  2 tab  Q4H  PRN


 PO  18 19:45


     


 


 


  (Shantel-Colace)  2 tab  Q12H  PRN


 PO  18 19:45


    18 23:26


 


 


  (M-M-R Ii Inj)  0.5 ml  ONCE ONCE


 SQ  18 16:00


 18 16:01   


 


 


  (Boostrix Inj)  0.5 ml  ONCE ONCE


 IM  18 16:00


 18 16:01  18 01:01


 


 


  (Zofran Inj)  4 mg  Q6H  PRN


 IV PUSH  18 19:45


     


 


 


 Miscellaneous


 Information  No systemic


 narcotics to be


 given except...  UNSCH  PRN


 .XX  18 21:15


 18 21:14   


 


 


 Miscellaneous


 Information  DO NOT


 ADMINISTER ANY


 ANTICOAGUL...  UNSCH  PRN


 .XX  18 21:15


 18 21:14   


 


 


 Fentanyl/


 Bupivacaine HCl  100 ml @ 0


 mls/hr  TITRATE


 EPIDURAL  18 21:15


     


 


 


  (ePHEDrine/NS 25


 MG/5 ML SYR)  10 mg  UNSCH  PRN


 IV PUSH  18 21:15


 18 21:14   


 


 


  (Motrin)  800 mg  Q8H  PRN


 PO  18 22:30


    18 23:27


 


 


 Miscellaneous


 Information  NO SYSTEMIC


 NARCOTICS TO BE


 GIVEN FO...  UNSCH  PRN


 .XX  18 01:00


 18 00:59   


 


 


  (Narcan Inj)  0.4 mg  UNSCH  PRN


 IV PUSH  18 01:00


 18 00:59   


 


 


  (Benadryl Inj)  25 mg  Q6H  PRN


 IV PUSH  18 01:00


 18 00:59   


 


 


  (Benadryl)  50 mg  Q6H  PRN


 PO  18 01:00


 18 00:59   


 


 


 Miscellaneous


 Information  ALL


 NURSING


 DEPARTMENTS  UNSCH  PRN


 .XX  18 01:00


 18 00:59   


 











Assessment/Plan


Problem List:  


(1) Chlamydia


ICD Codes:  A74.9 - Chlamydial infection, unspecified


(2) Postpartum care following  delivery


ICD Codes:  Z39.2 - Encounter for routine postpartum follow-up


Assessment and Plan


Ms. Ingram is a 23 yo  who is POD 1 from CS (failure to progress)  at 

1905. 


-Continue post-partum care


   -Continue to monitor VS, vaginal bleeding


   -Encourage ambulation


   -Percocet/Motrin for pain control


   -Shantel-colace for stool softening





-Bacterial vaginosis


   -Continue Flagyl 500mg BID





-Vaginal candida


   -s/p Diflucan x1





Chlamydia


   -s/p Azithromycin 1gm











Luis Miguel Reyes MD, R3 2018 08:36
Subjective


Post Operative Day:  2


Remarks


Ms. Ingram is a 23 yo  who is POD 2 from CS (failure to progress)  at 

1905. 


Patient has been afebrile with stable vital signs overnight. Patient reports 

that her abdominal pain is worse; she attributes this to medications associated 

with surgery wearing off. Patient has had light vaginal bleeding. Patient has 

been ambulating well; she uses assistance when in pain. Patient reports some 

dysuria. Patient passing gas; no bowel movements. Patient is bottle feeding. No 

chest pain, shortness of breath, or leg swelling. Patient reports that she was 

visited by [facility].





Objective


Vitals/I&O





Vital Signs








  Date Time  Temp Pulse Resp B/P (MAP) Pulse Ox O2 Delivery O2 Flow Rate FiO2


 


18 20:00 97.7 70 20 112/69 (83) 97   


 


18 16:30 98.4 82 18 117/75 (89)    


 


18 12:30 98.7 87 18 108/77 (87)    


 


18 08:30 98.0 68 16 110/84 (93)    








Result Diagram:  


18 0436





Objective Remarks


GENERAL: Well-nourished, well-developed patient.


CARDIOVASCULAR: Regular rate and rhythm without murmurs. Normal perfusion


RESPIRATORY: CTAB; normal rate


ABDOMEN/GI: Abdomen soft, non-tender, bowel sounds present. 


   Incision: Clean, dry and intact.


   Fundus: Firm, non-tender at umbilicus.


GENITOURINARY: Light bleeding.


EXTREMITIES: No cyanosis or edema, non-tender, without signs of DVT.


Medications and IVs





Current Medications








 Medications


  (Trade)  Dose


 Ordered  Sig/[place]


 Route  Start Time


 Stop Time Status Last Admin


 


 Lactated Ringer's  1,000 ml @ 


 125 mls/hr  Q8H


 IV  1/15/18 06:52


    18 23:25


 


 


 Lactated Ringer's  1,000 ml @ 


 3,000 mls/hr  Q20M PRN


 IV  1/15/18 06:52


     


 


 


 Sodium Chloride  1,000 ml @ 


 100 mls/hr  Q10H PRN


 IV  1/15/18 07:12


     


 


 


  (Bicitra Liq)  30 ml  ON  CALL


 PO  1/15/18 07:00


 18 06:59  18 20:16


 


 


  (fentaNYL INJ)  50 mcg  Q1H  PRN


 IV PUSH  1/15/18 07:00


    1/15/18 14:27


 


 


  (fentaNYL INJ)  100 mcg  Q1H  PRN


 IV PUSH  1/15/18 07:00


    1/15/18 17:41


 


 


  (Muri-Lube Oil)  10 ml  UNSCH  PRN


 TOPICAL  1/15/18 07:00


     


 


 


 Cefazolin Sodium/


 Dextrose  50 ml @ 


 100 mls/hr  ON  CALL


 IV  18 17:30


 18 17:29  18 20:16


 


 


  (NS Flush)  2 ml  BID


 IV FLUSH  18 21:00


    18 21:23


 


 


  (NS Flush)  2 ml  UNSCH  PRN


 IV FLUSH  18 19:45


     


 


 


  (Mylicon Chew)  80 mg  QID  PRN


 PO  18 19:45


     


 


 


  (Percocet  5-325


 Mg)  1 tab  Q4H  PRN


 PO  18 19:45


    18 21:09


 


 


  (Percocet  5-325


 Mg)  2 tab  Q4H  PRN


 PO  18 19:45


    18 05:13


 


 


  (Shantel-Colace)  2 tab  Q12H  PRN


 PO  18 19:45


    18 23:26


 


 


  (Zofran Inj)  4 mg  Q6H  PRN


 IV PUSH  18 19:45


     


 


 


 Fentanyl/


 Bupivacaine HCl  100 ml @ 0


 mls/hr  TITRATE


 EPIDURAL  18 21:15


     


 


 


  (Motrin)  800 mg  Q8H  PRN


 PO  18 22:30


    18 05:13


 


 


  (Flagyl)  500 mg  BID


 PO  18 09:00


    18 21:08


 


 


  (Ferrous Sulfate)  325 mg  BID


 PO  18 09:00


     


 











Assessment/Plan


Problem List:  


(1) Chlamydia


ICD Codes:  A74.9 - Chlamydial infection, unspecified


(2) Postpartum care following  delivery


ICD Codes:  Z39.2 - Encounter for routine postpartum follow-up


Assessment and Plan


Ms. Ingram is a 23 yo  who is POD 1 from CS (failure to progress)  at 

1905. 


-Continue post-partum care


   -Continue to monitor VS, vaginal bleeding


   -Encourage ambulation


   -Percocet/Motrin for pain control


      -Will add IM Toradol for breakthrough pain


   -Shantel-colace for stool softening





-Bacterial vaginosis


   -Continue Flagyl 500mg BID





-Vaginal candida


   -s/p Diflucan x1





Chlamydia


   -s/p Azithromycin 1gm











Luis Miguel Reyes MD, R3 2018 07:40
Subjective


Remarks


Ms. Ingram is a 21 yo  who is POD 3 from CS (failure to progress)  at 

1905. 


Patient has been afebrile with stable vital signs overnight. Patient reports 

that her abdominal pain is controlled at this time; she has also been 

ambulating better. Patient has had light vaginal bleeding. No dysuria. Patient 

passing gas; no bowel movements. Patient is bottle feeding. No chest pain, 

shortness of breath, or leg swelling. Patient plans to have follow-up in ~1 

week.





Objective


Vitals/I&O





Vital Signs








  Date Time  Temp Pulse Resp B/P (MAP) Pulse Ox O2 Delivery O2 Flow Rate FiO2


 


18 06:30 98.8 64 18 116/84 (95)    


 


18 20:30  69  119/85 (96)    


 


18 19:30  67  118/88 (98)    


 


18 19:30 98.5  18     








Result Diagram:  


18 0436





Objective Remarks


GENERAL: Well-nourished, well-developed patient.


CARDIOVASCULAR: Regular rate and rhythm without murmurs. Normal perfusion


RESPIRATORY: CTAB; normal rate


ABDOMEN/GI: Abdomen soft, non-tender, bowel sounds present. 


   Incision: Clean, dry and intact.


   Fundus: Firm, non-tender at umbilicus.


GENITOURINARY: Light bleeding.


EXTREMITIES: No cyanosis or edema, non-tender, without signs of DVT.


Medications and IVs





Current Medications








 Medications


  (Trade)  Dose


 Ordered  Sig/[place]


 Route  Start Time


 Stop Time Status Last Admin


 


 Lactated Ringer's  1,000 ml @ 


 125 mls/hr  Q8H


 IV  1/15/18 06:52


    18 23:25


 


 


 Lactated Ringer's  1,000 ml @ 


 3,000 mls/hr  Q20M PRN


 IV  1/15/18 06:52


     


 


 


 Sodium Chloride  1,000 ml @ 


 100 mls/hr  Q10H PRN


 IV  1/15/18 07:12


     


 


 


  (fentaNYL INJ)  50 mcg  Q1H  PRN


 IV PUSH  1/15/18 07:00


    1/15/18 14:27


 


 


  (fentaNYL INJ)  100 mcg  Q1H  PRN


 IV PUSH  1/15/18 07:00


    1/15/18 17:41


 


 


  (Muri-Lube Oil)  10 ml  UNSCH  PRN


 TOPICAL  1/15/18 07:00


     


 


 


 Cefazolin Sodium/


 Dextrose  50 ml @ 


 100 mls/hr  ON  CALL


 IV  18 17:30


 18 17:29  18 20:16


 


 


  (NS Flush)  2 ml  BID


 IV FLUSH  18 21:00


    18 21:23


 


 


  (NS Flush)  2 ml  UNSCH  PRN


 IV FLUSH  18 19:45


     


 


 


  (Mylicon Chew)  80 mg  QID  PRN


 PO  18 19:45


     


 


 


  (Percocet  5-325


 Mg)  1 tab  Q4H  PRN


 PO  18 19:45


    18 21:09


 


 


  (Percocet  5-325


 Mg)  2 tab  Q4H  PRN


 PO  18 19:45


    18 04:58


 


 


  (Shantel-Colace)  2 tab  Q12H  PRN


 PO  18 19:45


    18 13:58


 


 


  (Zofran Inj)  4 mg  Q6H  PRN


 IV PUSH  18 19:45


     


 


 


 Fentanyl/


 Bupivacaine HCl  100 ml @ 0


 mls/hr  TITRATE


 EPIDURAL  18 21:15


     


 


 


  (Motrin)  800 mg  Q8H  PRN


 PO  18 22:30


    18 00:04


 


 


  (Flagyl)  500 mg  BID


 PO  18 09:00


    18 21:53


 


 


  (Ferrous Sulfate)  325 mg  BID


 PO  18 09:00


    18 21:30


 


 


  (Toradol Inj)  30 mg  Q6H  PRN


 IM  18 07:45


 18 07:44  18 08:46


 











Assessment/Plan


Problem List:  


(1) Chlamydia


ICD Codes:  A74.9 - Chlamydial infection, unspecified


(2) Postpartum care following  delivery


ICD Codes:  Z39.2 - Encounter for routine postpartum follow-up


Assessment and Plan


Ms. Ingram is a 21 yo  who is POD 3 from CS (failure to progress)  at 

1905. 


-Continue post-partum care


   -Continue to monitor VS, vaginal bleeding


   -Encourage ambulation


   -Percocet/Motrin for pain control


   -Shantel-colace for stool softening





-Bacterial vaginosis


   -Continue Flagyl 500mg BID x 7 days





-Vaginal candida


   -s/p Diflucan x1





Chlamydia


   -s/p Azithromycin 1gm











Luis Miguel Reyes MD, R3 2018 08:38
room air
show